# Patient Record
Sex: FEMALE | Race: WHITE | Employment: OTHER | URBAN - NONMETROPOLITAN AREA
[De-identification: names, ages, dates, MRNs, and addresses within clinical notes are randomized per-mention and may not be internally consistent; named-entity substitution may affect disease eponyms.]

---

## 2017-06-06 ENCOUNTER — DOCTOR'S OFFICE (OUTPATIENT)
Dept: URBAN - NONMETROPOLITAN AREA CLINIC 1 | Facility: CLINIC | Age: 82
Setting detail: OPHTHALMOLOGY
End: 2017-06-06
Payer: COMMERCIAL

## 2017-06-06 DIAGNOSIS — E11.9: ICD-10-CM

## 2017-06-06 DIAGNOSIS — H16.223: ICD-10-CM

## 2017-06-06 DIAGNOSIS — H43.811: ICD-10-CM

## 2017-06-06 DIAGNOSIS — H27.8: ICD-10-CM

## 2017-06-06 DIAGNOSIS — H18.51: ICD-10-CM

## 2017-06-06 PROCEDURE — 92014 COMPRE OPH EXAM EST PT 1/>: CPT | Performed by: OPHTHALMOLOGY

## 2017-06-06 ASSESSMENT — LID POSITION - DERMATOCHALASIS
OD_DERMATOCHALASIS: 2+
OS_DERMATOCHALASIS: 2+

## 2017-06-06 ASSESSMENT — PUNCTA - ASSESSMENT
OS_PUNCTA: SIL PLUG LLL
OD_PUNCTA: SIL PLUG RLL

## 2017-06-06 ASSESSMENT — CORNEAL DYSTROPHY - POSTERIOR
OS_POSTERIORDYSTROPHY: 2+ 3+ GUTTATA
OD_POSTERIORDYSTROPHY: 2+ 3+ GUTTATA

## 2017-06-06 ASSESSMENT — SUPERFICIAL PUNCTATE KERATITIS (SPK): OS_SPK: 2+

## 2017-06-06 ASSESSMENT — CONFRONTATIONAL VISUAL FIELD TEST (CVF)
OS_FINDINGS: FULL
OD_FINDINGS: FULL

## 2017-06-06 ASSESSMENT — DRY EYES - PHYSICIAN NOTES
OD_GENERALCOMMENTS: LOW TEAR FILM
OS_GENERALCOMMENTS: LOW TEAR FILM

## 2017-06-06 ASSESSMENT — LID POSITION - PTOSIS
OS_PTOSIS: 1+
OD_PTOSIS: 1+

## 2017-06-08 ASSESSMENT — REFRACTION_MANIFEST
OD_CYLINDER: -0.50
OD_VA3: 20/
OD_VA3: 20/
OU_VA: 20/
OD_VA2: 20/
OD_VA1: 20/
OS_VA3: 20/
OD_VA3: 20/
OD_VA1: 20/
OS_ADD: +2.75
OD_VA2: 20/
OS_VA2: 20/
OD_AXIS: 150
OS_AXIS: 065
OS_VA1: 20/
OD_VA2: 20/30
OS_SPHERE: +1.25
OD_SPHERE: +0.50
OU_VA: 20/
OS_VA2: 20/
OS_VA1: 20/
OS_VA3: 20/
OD_ADD: +2.75
OS_VA2: 20/30
OS_VA3: 20/
OU_VA: 20/
OS_CYLINDER: -0.25
OD_VA1: 20/40+2
OS_VA1: 20/40+2

## 2017-06-08 ASSESSMENT — REFRACTION_CURRENTRX
OD_OVR_VA: 20/
OS_OVR_VA: 20/
OD_OVR_VA: 20/
OS_OVR_VA: 20/
OS_OVR_VA: 20/
OD_OVR_VA: 20/

## 2017-06-08 ASSESSMENT — SPHEQUIV_DERIVED
OD_SPHEQUIV: 0.25
OS_SPHEQUIV: 1.125
OD_SPHEQUIV: 0.25
OS_SPHEQUIV: 0.75

## 2017-06-08 ASSESSMENT — VISUAL ACUITY
OS_BCVA: 20/40-2
OD_BCVA: 20/40-

## 2017-06-08 ASSESSMENT — REFRACTION_AUTOREFRACTION
OS_CYLINDER: -1.00
OD_CYLINDER: -1.50
OD_SPHERE: +1.00
OS_SPHERE: +1.25
OS_AXIS: 92
OD_AXIS: 77

## 2017-09-22 ENCOUNTER — AMBUL SURGICAL CARE (OUTPATIENT)
Dept: URBAN - NONMETROPOLITAN AREA SURGERY 1 | Facility: SURGERY | Age: 82
Setting detail: OPHTHALMOLOGY
End: 2017-09-22
Payer: COMMERCIAL

## 2017-09-22 DIAGNOSIS — D23.12: ICD-10-CM

## 2017-09-22 DIAGNOSIS — D23.11: ICD-10-CM

## 2017-09-22 PROCEDURE — 67810 INCAL BX EYELID SKN LID MRGN: CPT | Performed by: OPHTHALMOLOGY

## 2017-09-22 PROCEDURE — G8907 PT DOC NO EVENTS ON DISCHARG: HCPCS | Performed by: OPHTHALMOLOGY

## 2017-09-22 PROCEDURE — 67961 REVISION OF EYELID: CPT | Performed by: OPHTHALMOLOGY

## 2017-10-16 ENCOUNTER — DOCTOR'S OFFICE (OUTPATIENT)
Dept: URBAN - NONMETROPOLITAN AREA CLINIC 1 | Facility: CLINIC | Age: 82
Setting detail: OPHTHALMOLOGY
End: 2017-10-16
Payer: COMMERCIAL

## 2017-10-16 DIAGNOSIS — H16.223: ICD-10-CM

## 2017-10-16 DIAGNOSIS — H27.8: ICD-10-CM

## 2017-10-16 DIAGNOSIS — E11.9: ICD-10-CM

## 2017-10-16 DIAGNOSIS — D23.12: ICD-10-CM

## 2017-10-16 DIAGNOSIS — D23.11: ICD-10-CM

## 2017-10-16 DIAGNOSIS — H18.51: ICD-10-CM

## 2017-10-16 PROCEDURE — 99024 POSTOP FOLLOW-UP VISIT: CPT | Performed by: OPHTHALMOLOGY

## 2017-10-16 ASSESSMENT — REFRACTION_MANIFEST
OD_SPHERE: +0.50
OD_VA3: 20/
OS_ADD: +2.75
OD_VA1: 20/
OS_VA1: 20/40+2
OD_AXIS: 150
OS_VA1: 20/
OD_VA3: 20/
OS_CYLINDER: -0.25
OS_VA2: 20/
OS_VA2: 20/
OU_VA: 20/
OD_VA2: 20/
OS_VA2: 20/30
OD_VA1: 20/
OS_AXIS: 065
OD_VA2: 20/30
OD_CYLINDER: -0.50
OU_VA: 20/
OS_SPHERE: +1.25
OD_VA1: 20/40+2
OD_ADD: +2.75
OD_VA3: 20/
OU_VA: 20/
OS_VA3: 20/
OD_VA2: 20/
OS_VA3: 20/
OS_VA1: 20/
OS_VA3: 20/

## 2017-10-16 ASSESSMENT — CORNEAL DYSTROPHY - POSTERIOR
OS_POSTERIORDYSTROPHY: 2+ 3+ GUTTATA
OD_POSTERIORDYSTROPHY: 2+ 3+ GUTTATA

## 2017-10-16 ASSESSMENT — REFRACTION_CURRENTRX
OD_OVR_VA: 20/
OS_OVR_VA: 20/
OS_OVR_VA: 20/
OD_OVR_VA: 20/
OS_OVR_VA: 20/
OD_OVR_VA: 20/

## 2017-10-16 ASSESSMENT — REFRACTION_AUTOREFRACTION
OS_CYLINDER: -1.00
OD_CYLINDER: -1.50
OS_SPHERE: +1.25
OS_AXIS: 92
OD_SPHERE: +1.00
OD_AXIS: 77

## 2017-10-16 ASSESSMENT — LID POSITION - DERMATOCHALASIS
OS_DERMATOCHALASIS: 2+
OD_DERMATOCHALASIS: 2+

## 2017-10-16 ASSESSMENT — VISUAL ACUITY
OD_BCVA: 20/40
OS_BCVA: 20/40+1

## 2017-10-16 ASSESSMENT — SPHEQUIV_DERIVED
OS_SPHEQUIV: 0.75
OS_SPHEQUIV: 1.125
OD_SPHEQUIV: 0.25
OD_SPHEQUIV: 0.25

## 2017-10-16 ASSESSMENT — LID POSITION - PTOSIS
OS_PTOSIS: 1+
OD_PTOSIS: 1+

## 2017-10-16 ASSESSMENT — CONFRONTATIONAL VISUAL FIELD TEST (CVF)
OD_FINDINGS: FULL
OS_FINDINGS: FULL

## 2017-10-16 ASSESSMENT — SUPERFICIAL PUNCTATE KERATITIS (SPK)
OS_SPK: 1+ 2+
OD_SPK: 1+ 2+

## 2017-10-16 ASSESSMENT — PUNCTA - ASSESSMENT
OD_PUNCTA: SIL PLUG RLL
OS_PUNCTA: SIL PLUG LLL

## 2018-07-16 ENCOUNTER — RX ONLY (RX ONLY)
Age: 83
End: 2018-07-16

## 2018-07-16 ENCOUNTER — DOCTOR'S OFFICE (OUTPATIENT)
Dept: URBAN - NONMETROPOLITAN AREA CLINIC 1 | Facility: CLINIC | Age: 83
Setting detail: OPHTHALMOLOGY
End: 2018-07-16
Payer: COMMERCIAL

## 2018-07-16 DIAGNOSIS — H02.834: ICD-10-CM

## 2018-07-16 DIAGNOSIS — D23.11: ICD-10-CM

## 2018-07-16 DIAGNOSIS — H16.223: ICD-10-CM

## 2018-07-16 DIAGNOSIS — H18.51: ICD-10-CM

## 2018-07-16 DIAGNOSIS — H02.831: ICD-10-CM

## 2018-07-16 DIAGNOSIS — H27.8: ICD-10-CM

## 2018-07-16 PROCEDURE — 92012 INTRM OPH EXAM EST PATIENT: CPT | Performed by: OPHTHALMOLOGY

## 2018-07-16 PROCEDURE — 76514 ECHO EXAM OF EYE THICKNESS: CPT | Performed by: OPHTHALMOLOGY

## 2018-07-16 PROCEDURE — 83861 MICROFLUID ANALY TEARS: CPT | Performed by: OPHTHALMOLOGY

## 2018-07-16 PROCEDURE — 92285 EXTERNAL OCULAR PHOTOGRAPHY: CPT | Performed by: OPHTHALMOLOGY

## 2018-07-16 ASSESSMENT — CONFRONTATIONAL VISUAL FIELD TEST (CVF)
OS_FINDINGS: FULL
OD_FINDINGS: FULL

## 2018-07-16 ASSESSMENT — REFRACTION_CURRENTRX
OS_OVR_VA: 20/
OS_OVR_VA: 20/
OD_OVR_VA: 20/
OD_OVR_VA: 20/
OS_OVR_VA: 20/
OD_OVR_VA: 20/

## 2018-07-16 ASSESSMENT — REFRACTION_MANIFEST
OS_VA1: 20/
OS_CYLINDER: -0.25
OS_VA3: 20/
OS_ADD: +2.75
OD_VA1: 20/
OD_SPHERE: +0.50
OD_VA1: 20/
OS_VA1: 20/40+2
OD_VA3: 20/
OS_VA2: 20/
OD_VA2: 20/
OS_VA3: 20/
OS_VA1: 20/
OS_VA3: 20/
OD_VA3: 20/
OD_CYLINDER: -0.50
OS_AXIS: 065
OD_VA2: 20/30
OD_ADD: +2.75
OD_VA1: 20/40+2
OU_VA: 20/
OS_VA2: 20/30
OU_VA: 20/
OS_VA2: 20/
OS_SPHERE: +1.25
OD_AXIS: 150
OD_VA2: 20/
OD_VA3: 20/
OU_VA: 20/

## 2018-07-16 ASSESSMENT — REFRACTION_AUTOREFRACTION
OD_AXIS: 77
OS_SPHERE: +1.25
OD_SPHERE: +1.00
OD_CYLINDER: -1.50
OS_AXIS: 92
OS_CYLINDER: -1.00

## 2018-07-16 ASSESSMENT — LID POSITION - DERMATOCHALASIS
OS_DERMATOCHALASIS: 2+
OD_DERMATOCHALASIS: 2+

## 2018-07-16 ASSESSMENT — LID POSITION - PTOSIS
OD_PTOSIS: 1+
OS_PTOSIS: 1+

## 2018-07-16 ASSESSMENT — PUNCTA - ASSESSMENT
OD_PUNCTA: SIL PLUG RLL
OS_PUNCTA: SIL PLUG LLL

## 2018-07-16 ASSESSMENT — CORNEAL DYSTROPHY - POSTERIOR
OD_POSTERIORDYSTROPHY: 2+ 3+ GUTTATA
OS_POSTERIORDYSTROPHY: 2+ 3+ GUTTATA

## 2018-07-16 ASSESSMENT — SPHEQUIV_DERIVED
OS_SPHEQUIV: 0.75
OD_SPHEQUIV: 0.25
OS_SPHEQUIV: 1.125
OD_SPHEQUIV: 0.25

## 2018-07-16 ASSESSMENT — VISUAL ACUITY
OS_BCVA: 20/40+1
OD_BCVA: 20/50-2

## 2018-07-16 ASSESSMENT — PACHYMETRY
OD_CT_UM: 570
OS_CT_CORRECTION: -4
OD_CT_CORRECTION: -2
OS_CT_UM: 594

## 2018-07-16 ASSESSMENT — SUPERFICIAL PUNCTATE KERATITIS (SPK)
OD_SPK: 1+ 2+
OS_SPK: 1+

## 2018-07-24 ENCOUNTER — DOCTOR'S OFFICE (OUTPATIENT)
Dept: URBAN - NONMETROPOLITAN AREA CLINIC 1 | Facility: CLINIC | Age: 83
Setting detail: OPHTHALMOLOGY
End: 2018-07-24
Payer: COMMERCIAL

## 2018-07-24 DIAGNOSIS — H52.4: ICD-10-CM

## 2018-07-24 DIAGNOSIS — H27.8: ICD-10-CM

## 2018-07-24 PROCEDURE — 92015 DETERMINE REFRACTIVE STATE: CPT | Performed by: OPTOMETRIST

## 2018-07-24 ASSESSMENT — REFRACTION_CURRENTRX
OS_OVR_VA: 20/
OS_OVR_VA: 20/
OD_OVR_VA: 20/
OS_OVR_VA: 20/
OD_OVR_VA: 20/
OD_OVR_VA: 20/

## 2018-07-24 ASSESSMENT — REFRACTION_MANIFEST
OD_VA1: 20/
OS_VA3: 20/
OS_VA3: 20/
OS_VA1: 20/
OS_VA2: 20/
OS_VA1: 20/
OD_VA2: 20/
OD_VA3: 20/
OU_VA: 20/
OD_VA1: 20/
OS_VA2: 20/
OU_VA: 20/
OD_VA3: 20/
OD_VA2: 20/

## 2018-07-24 ASSESSMENT — REFRACTION_AUTOREFRACTION
OD_CYLINDER: -0.50
OS_CYLINDER: -1.25
OS_SPHERE: +0.50
OD_SPHERE: +0.75
OS_AXIS: 057
OD_AXIS: 150

## 2018-07-24 ASSESSMENT — SPHEQUIV_DERIVED
OD_SPHEQUIV: 0.5
OS_SPHEQUIV: -0.125

## 2018-07-24 ASSESSMENT — REFRACTION_OUTSIDERX
OU_VA: 20/
OD_CYLINDER: -0.50
OS_SPHERE: +1.00
OS_AXIS: 065
OD_SPHERE: +0.50
OD_VA1: 20/40+2
OS_VA3: 20/
OS_VA2: 20/40
OD_VA2: 20/30
OS_VA1: 20/40
OS_ADD: +2.75
OD_ADD: +2.75
OD_AXIS: 150
OD_VA3: 20/
OS_CYLINDER: -0.25

## 2018-07-24 ASSESSMENT — VISUAL ACUITY
OS_BCVA: 20/40-1
OD_BCVA: 20/50-1

## 2018-10-23 ENCOUNTER — AMBUL SURGICAL CARE (OUTPATIENT)
Dept: URBAN - NONMETROPOLITAN AREA SURGERY 1 | Facility: SURGERY | Age: 83
Setting detail: OPHTHALMOLOGY
End: 2018-10-23
Payer: COMMERCIAL

## 2018-10-23 DIAGNOSIS — D23.121: ICD-10-CM

## 2018-10-23 DIAGNOSIS — D23.11: ICD-10-CM

## 2018-10-23 PROCEDURE — G8907 PT DOC NO EVENTS ON DISCHARG: HCPCS | Performed by: OPHTHALMOLOGY

## 2018-10-23 PROCEDURE — 67810 INCAL BX EYELID SKN LID MRGN: CPT | Performed by: OPHTHALMOLOGY

## 2018-10-23 PROCEDURE — 67840 REMOVE EYELID LESION: CPT | Performed by: OPHTHALMOLOGY

## 2018-10-23 PROCEDURE — G8918 PT W/O PREOP ORDER IV AB PRO: HCPCS | Performed by: OPHTHALMOLOGY

## 2021-01-21 ENCOUNTER — IMMUNIZATIONS (OUTPATIENT)
Dept: FAMILY MEDICINE CLINIC | Facility: HOSPITAL | Age: 86
End: 2021-01-21

## 2021-01-21 DIAGNOSIS — Z23 ENCOUNTER FOR IMMUNIZATION: Primary | ICD-10-CM

## 2021-01-21 PROCEDURE — 0011A SARS-COV-2 / COVID-19 MRNA VACCINE (MODERNA) 100 MCG: CPT

## 2021-01-21 PROCEDURE — 91301 SARS-COV-2 / COVID-19 MRNA VACCINE (MODERNA) 100 MCG: CPT

## 2021-02-24 ENCOUNTER — IMMUNIZATIONS (OUTPATIENT)
Dept: FAMILY MEDICINE CLINIC | Facility: HOSPITAL | Age: 86
End: 2021-02-24

## 2021-02-24 DIAGNOSIS — Z23 ENCOUNTER FOR IMMUNIZATION: Primary | ICD-10-CM

## 2021-02-24 PROCEDURE — 0012A SARS-COV-2 / COVID-19 MRNA VACCINE (MODERNA) 100 MCG: CPT

## 2021-02-24 PROCEDURE — 91301 SARS-COV-2 / COVID-19 MRNA VACCINE (MODERNA) 100 MCG: CPT

## 2021-04-09 ENCOUNTER — DOCTOR'S OFFICE (OUTPATIENT)
Dept: URBAN - NONMETROPOLITAN AREA CLINIC 1 | Facility: CLINIC | Age: 86
Setting detail: OPHTHALMOLOGY
End: 2021-04-09
Payer: COMMERCIAL

## 2021-04-09 ENCOUNTER — RX ONLY (RX ONLY)
Age: 86
End: 2021-04-09

## 2021-04-09 VITALS — HEIGHT: 55 IN

## 2021-04-09 DIAGNOSIS — H16.223: ICD-10-CM

## 2021-04-09 DIAGNOSIS — H27.8: ICD-10-CM

## 2021-04-09 DIAGNOSIS — H02.433: ICD-10-CM

## 2021-04-09 DIAGNOSIS — H02.423: ICD-10-CM

## 2021-04-09 DIAGNOSIS — H02.413: ICD-10-CM

## 2021-04-09 DIAGNOSIS — H43.811: ICD-10-CM

## 2021-04-09 DIAGNOSIS — H43.393: ICD-10-CM

## 2021-04-09 DIAGNOSIS — E11.9: ICD-10-CM

## 2021-04-09 DIAGNOSIS — H02.403: ICD-10-CM

## 2021-04-09 DIAGNOSIS — H18.70: ICD-10-CM

## 2021-04-09 PROCEDURE — 92134 CPTRZ OPH DX IMG PST SGM RTA: CPT | Performed by: OPHTHALMOLOGY

## 2021-04-09 PROCEDURE — 92014 COMPRE OPH EXAM EST PT 1/>: CPT | Performed by: OPHTHALMOLOGY

## 2021-04-09 PROCEDURE — 92286 ANT SGM IMG I&R SPECLR MIC: CPT | Performed by: OPHTHALMOLOGY

## 2021-04-09 ASSESSMENT — SUPERFICIAL PUNCTATE KERATITIS (SPK)
OS_SPK: 1+
OD_SPK: 1+ 2+

## 2021-04-09 ASSESSMENT — TONOMETRY
OD_IOP_MMHG: 13
OS_IOP_MMHG: 13

## 2021-04-09 ASSESSMENT — REFRACTION_MANIFEST
OS_CYLINDER: -0.25
OD_AXIS: 150
OD_CYLINDER: -0.50
OS_VA2: 20/40
OS_VA1: 20/40
OD_VA2: 20/30
OD_ADD: +2.75
OS_AXIS: 065
OD_SPHERE: +0.50
OS_ADD: +2.75
OS_SPHERE: +1.00
OD_VA1: 20/40+2

## 2021-04-09 ASSESSMENT — CONFRONTATIONAL VISUAL FIELD TEST (CVF)
OS_FINDINGS: FULL
OD_FINDINGS: FULL

## 2021-04-09 ASSESSMENT — PUNCTA - ASSESSMENT
OS_PUNCTA: SIL PLUG LLL
OD_PUNCTA: SIL PLUG RLL

## 2021-04-09 ASSESSMENT — LID POSITION - DERMATOCHALASIS
OD_DERMATOCHALASIS: 2+
OS_DERMATOCHALASIS: 2+

## 2021-04-09 ASSESSMENT — SPHEQUIV_DERIVED
OS_SPHEQUIV: 0.875
OD_SPHEQUIV: 0.25

## 2021-04-09 ASSESSMENT — PACHYMETRY
OS_CT_UM: 594
OD_CT_UM: 570
OD_CT_CORRECTION: -2
OS_CT_CORRECTION: -4

## 2021-04-09 ASSESSMENT — VISUAL ACUITY
OD_BCVA: 20/100
OS_BCVA: 20/70+1

## 2021-04-09 ASSESSMENT — LID POSITION - PTOSIS
OS_PTOSIS: 1+
OD_PTOSIS: 1+

## 2021-04-09 ASSESSMENT — CORNEAL DYSTROPHY - POSTERIOR
OD_POSTERIORDYSTROPHY: 2+ 3+ GUTTATA
OS_POSTERIORDYSTROPHY: 2+ 3+ GUTTATA

## 2021-04-09 ASSESSMENT — REFRACTION_CURRENTRX
OS_OVR_VA: 20/
OD_OVR_VA: 20/

## 2021-04-27 ENCOUNTER — DOCTOR'S OFFICE (OUTPATIENT)
Dept: URBAN - NONMETROPOLITAN AREA CLINIC 1 | Facility: CLINIC | Age: 86
Setting detail: OPHTHALMOLOGY
End: 2021-04-27
Payer: COMMERCIAL

## 2021-04-27 ENCOUNTER — OPTICAL OFFICE (OUTPATIENT)
Dept: URBAN - NONMETROPOLITAN AREA CLINIC 4 | Facility: CLINIC | Age: 86
Setting detail: OPHTHALMOLOGY
End: 2021-04-27
Payer: COMMERCIAL

## 2021-04-27 DIAGNOSIS — H27.8: ICD-10-CM

## 2021-04-27 DIAGNOSIS — H52.4: ICD-10-CM

## 2021-04-27 DIAGNOSIS — H52.223: ICD-10-CM

## 2021-04-27 PROCEDURE — V2020 VISION SVCS FRAMES PURCHASES: HCPCS | Performed by: OPTOMETRIST

## 2021-04-27 PROCEDURE — 92015 DETERMINE REFRACTIVE STATE: CPT | Performed by: OPTOMETRIST

## 2021-04-27 PROCEDURE — V2203 LENS SPHCYL BIFOCAL 4.00D/.1: HCPCS | Performed by: OPTOMETRIST

## 2021-04-27 PROCEDURE — V2784 LENS POLYCARB OR EQUAL: HCPCS | Performed by: OPTOMETRIST

## 2021-04-27 ASSESSMENT — REFRACTION_MANIFEST
OD_CYLINDER: -1.00
OS_AXIS: 045
OD_SPHERE: +1.00
OS_VA2: 20/70+2
OS_SPHERE: +0.25
OS_ADD: +2.75
OS_CYLINDER: -1.50
OD_VA1: 20/60+2
OD_ADD: +2.75
OD_VA2: 20/60+2
OS_VA1: 20/70+2
OD_AXIS: 050

## 2021-04-27 ASSESSMENT — REFRACTION_CURRENTRX
OS_AXIS: 174
OS_OVR_VA: 20/
OD_AXIS: 095
OD_OVR_VA: 20/
OD_SPHERE: -0.75
OS_CYLINDER: -0.50
OD_CYLINDER: -2.00
OS_SPHERE: -1.00

## 2021-04-27 ASSESSMENT — VISUAL ACUITY
OD_BCVA: 20/80
OS_BCVA: 20/70

## 2021-04-27 ASSESSMENT — REFRACTION_AUTOREFRACTION
OD_SPHERE: +1.50
OD_CYLINDER: -1.25
OD_AXIS: 049
OS_AXIS: 046
OS_CYLINDER: -2.50
OS_SPHERE: +0.75

## 2021-04-27 ASSESSMENT — SPHEQUIV_DERIVED
OD_SPHEQUIV: 0.5
OS_SPHEQUIV: -0.5
OD_SPHEQUIV: 0.875
OS_SPHEQUIV: -0.5

## 2021-04-27 ASSESSMENT — CONFRONTATIONAL VISUAL FIELD TEST (CVF)
OS_FINDINGS: FULL
OD_FINDINGS: FULL

## 2021-06-01 ENCOUNTER — DOCTOR'S OFFICE (OUTPATIENT)
Dept: URBAN - NONMETROPOLITAN AREA CLINIC 1 | Facility: CLINIC | Age: 86
Setting detail: OPHTHALMOLOGY
End: 2021-06-01

## 2021-06-01 DIAGNOSIS — H52.4: ICD-10-CM

## 2021-06-01 DIAGNOSIS — H27.8: ICD-10-CM

## 2021-06-01 PROCEDURE — NCRX N/C GLASSES RX: Performed by: OPTOMETRIST

## 2021-06-01 ASSESSMENT — REFRACTION_MANIFEST
OS_VA1: 20/70+2
OD_VA1: 20/60+2
OS_SPHERE: +0.25
OS_CYLINDER: -2.00
OD_ADD: +2.75
OS_AXIS: 045
OD_VA2: 20/60+2
OS_ADD: +2.75
OS_VA2: 20/70+2
OD_CYLINDER: -1.00
OD_SPHERE: +0.75
OD_AXIS: 100

## 2021-06-01 ASSESSMENT — REFRACTION_CURRENTRX
OS_OVR_VA: 20/
OS_CYLINDER: -1.50
OS_SPHERE: +0.25
OD_AXIS: 058
OS_AXIS: 048
OD_SPHERE: +1.25
OD_CYLINDER: -1.00
OD_OVR_VA: 20/
OD_ADD: +3.00
OD_VPRISM_DIRECTION: BF
OS_VPRISM_DIRECTION: BF
OS_ADD: +3.00

## 2021-06-01 ASSESSMENT — CONFRONTATIONAL VISUAL FIELD TEST (CVF)
OD_FINDINGS: FULL
OS_FINDINGS: FULL

## 2021-06-01 ASSESSMENT — REFRACTION_AUTOREFRACTION
OD_CYLINDER: -0.25
OD_AXIS: 116
OD_SPHERE: +0.75
OS_CYLINDER: -2.00
OS_SPHERE: 0.00
OS_AXIS: 072

## 2021-06-01 ASSESSMENT — SPHEQUIV_DERIVED
OD_SPHEQUIV: 0.625
OD_SPHEQUIV: 0.25
OS_SPHEQUIV: -1
OS_SPHEQUIV: -0.75

## 2021-06-01 ASSESSMENT — VISUAL ACUITY
OS_BCVA: 20/60-2
OD_BCVA: 20/70

## 2021-11-30 ENCOUNTER — DOCTOR'S OFFICE (OUTPATIENT)
Dept: URBAN - NONMETROPOLITAN AREA CLINIC 1 | Facility: CLINIC | Age: 86
Setting detail: OPHTHALMOLOGY
End: 2021-11-30
Payer: COMMERCIAL

## 2021-11-30 DIAGNOSIS — H16.221: ICD-10-CM

## 2021-11-30 DIAGNOSIS — H18.70: ICD-10-CM

## 2021-11-30 DIAGNOSIS — H52.221: ICD-10-CM

## 2021-11-30 DIAGNOSIS — H52.222: ICD-10-CM

## 2021-11-30 DIAGNOSIS — H27.8: ICD-10-CM

## 2021-11-30 DIAGNOSIS — H16.223: ICD-10-CM

## 2021-11-30 DIAGNOSIS — H43.811: ICD-10-CM

## 2021-11-30 DIAGNOSIS — H16.222: ICD-10-CM

## 2021-11-30 PROCEDURE — 83861 MICROFLUID ANALY TEARS: CPT | Performed by: OPHTHALMOLOGY

## 2021-11-30 PROCEDURE — 92025 CPTRIZED CORNEAL TOPOGRAPHY: CPT | Performed by: OPHTHALMOLOGY

## 2021-11-30 PROCEDURE — 92286 ANT SGM IMG I&R SPECLR MIC: CPT | Performed by: OPHTHALMOLOGY

## 2021-11-30 PROCEDURE — 92134 CPTRZ OPH DX IMG PST SGM RTA: CPT | Performed by: OPHTHALMOLOGY

## 2021-11-30 PROCEDURE — 99214 OFFICE O/P EST MOD 30 MIN: CPT | Performed by: OPHTHALMOLOGY

## 2021-11-30 ASSESSMENT — REFRACTION_MANIFEST
OD_AXIS: 100
OS_CYLINDER: -2.00
OS_SPHERE: +0.25
OD_CYLINDER: -1.00
OS_VA2: 20/70+2
OS_ADD: +2.75
OD_VA2: 20/60+2
OD_VA1: 20/60+2
OS_AXIS: 045
OS_VA1: 20/70+2
OD_SPHERE: +0.75
OD_ADD: +2.75

## 2021-11-30 ASSESSMENT — REFRACTION_AUTOREFRACTION
OD_CYLINDER: -0.25
OS_SPHERE: 0.00
OD_SPHERE: +0.75
OS_AXIS: 072
OS_CYLINDER: -2.00
OD_AXIS: 116

## 2021-11-30 ASSESSMENT — TONOMETRY: OS_IOP_MMHG: 11

## 2021-11-30 ASSESSMENT — PACHYMETRY
OD_CT_UM: 570
OS_CT_CORRECTION: -4
OD_CT_CORRECTION: -2
OS_CT_UM: 594

## 2021-11-30 ASSESSMENT — SUPERFICIAL PUNCTATE KERATITIS (SPK)
OD_SPK: 1+ 2+
OS_SPK: 1+

## 2021-11-30 ASSESSMENT — LID POSITION - DERMATOCHALASIS
OD_DERMATOCHALASIS: 2+
OS_DERMATOCHALASIS: 2+

## 2021-11-30 ASSESSMENT — CORNEAL DYSTROPHY - POSTERIOR
OS_POSTERIORDYSTROPHY: 2+ 3+ GUTTATA
OD_POSTERIORDYSTROPHY: 2+ 3+ GUTTATA

## 2021-11-30 ASSESSMENT — REFRACTION_CURRENTRX
OD_VPRISM_DIRECTION: BF
OS_VPRISM_DIRECTION: BF
OD_SPHERE: +1.25
OS_OVR_VA: 20/
OD_OVR_VA: 20/
OS_ADD: +3.00
OS_SPHERE: +0.25
OD_ADD: +3.00
OS_AXIS: 048
OS_CYLINDER: -1.50
OD_CYLINDER: -1.00
OD_AXIS: 058

## 2021-11-30 ASSESSMENT — VISUAL ACUITY
OD_BCVA: 20/50-2
OS_BCVA: 20/60

## 2021-11-30 ASSESSMENT — LID POSITION - PTOSIS
OS_PTOSIS: 1+
OD_PTOSIS: 1+

## 2021-11-30 ASSESSMENT — CONFRONTATIONAL VISUAL FIELD TEST (CVF)
OD_FINDINGS: FULL
OS_FINDINGS: FULL

## 2021-11-30 ASSESSMENT — SPHEQUIV_DERIVED
OD_SPHEQUIV: 0.625
OS_SPHEQUIV: -0.75
OS_SPHEQUIV: -1
OD_SPHEQUIV: 0.25

## 2021-11-30 ASSESSMENT — PUNCTA - ASSESSMENT
OS_PUNCTA: SIL PLUG LLL
OD_PUNCTA: SIL PLUG RLL

## 2021-12-06 ENCOUNTER — DOCTOR'S OFFICE (OUTPATIENT)
Dept: URBAN - NONMETROPOLITAN AREA CLINIC 1 | Facility: CLINIC | Age: 86
Setting detail: OPHTHALMOLOGY
End: 2021-12-06

## 2021-12-06 ENCOUNTER — OPTICAL OFFICE (OUTPATIENT)
Dept: URBAN - NONMETROPOLITAN AREA CLINIC 4 | Facility: CLINIC | Age: 86
Setting detail: OPHTHALMOLOGY
End: 2021-12-06

## 2021-12-06 DIAGNOSIS — H52.4: ICD-10-CM

## 2021-12-06 DIAGNOSIS — H52.223: ICD-10-CM

## 2021-12-06 DIAGNOSIS — H52.222: ICD-10-CM

## 2021-12-06 DIAGNOSIS — H52.221: ICD-10-CM

## 2021-12-06 PROCEDURE — V2103 SPHEROCYLINDR 4.00D/12-2.00D: HCPCS | Performed by: OPTOMETRIST

## 2021-12-06 PROCEDURE — V2784 LENS POLYCARB OR EQUAL: HCPCS | Performed by: OPTOMETRIST

## 2021-12-06 PROCEDURE — V2715 PRISM LENS/ES: HCPCS | Performed by: OPTOMETRIST

## 2021-12-06 PROCEDURE — NCRX N/C GLASSES RX: Performed by: OPTOMETRIST

## 2021-12-06 PROCEDURE — V2020 VISION SVCS FRAMES PURCHASES: HCPCS | Performed by: OPTOMETRIST

## 2021-12-06 ASSESSMENT — REFRACTION_CURRENTRX
OD_AXIS: 61
OD_OVR_VA: 20/
OD_VPRISM_DIRECTION: SV
OD_SPHERE: +3.25
OS_SPHERE: +3.75
OS_AXIS: 43
OS_SPHERE: +0.25
OS_OVR_VA: 20/
OD_AXIS: 99
OD_OVR_VA: 20/
OS_AXIS: 75
OS_VPRISM_DIRECTION: BF
OD_CYLINDER: -0.50
OD_ADD: +3.00
OS_ADD: +3.00
OD_SPHERE: +0.75
OD_CYLINDER: -1.00
OS_CYLINDER: -2.00
OD_VPRISM_DIRECTION: BF
OS_OVR_VA: 20/
OS_VPRISM_DIRECTION: SV
OS_CYLINDER: -1.00

## 2021-12-06 ASSESSMENT — REFRACTION_MANIFEST
OD_ADD: +2.75
OS_AXIS: 070
OD_CYLINDER: -0.75
OD_VA1: 20/40
OS_CYLINDER: -1.50
OD_VPRISM_DIRECTION: BI
OS_VA2: 20/50+2
OS_ADD: +2.75
OD_AXIS: 070
OS_VA1: 20/50+2
OD_VA2: 20/40
OD_SPHERE: +0.50
OS_VPRISM: BU
OS_SPHERE: PLANO
OD_HPRISM: 1.5

## 2021-12-06 ASSESSMENT — CONFRONTATIONAL VISUAL FIELD TEST (CVF)
OS_FINDINGS: FULL
OD_FINDINGS: FULL

## 2021-12-06 ASSESSMENT — REFRACTION_AUTOREFRACTION
OS_AXIS: 75
OD_AXIS: 50
OD_SPHERE: +0.50
OS_SPHERE: -0.25
OD_CYLINDER: -0.50
OS_CYLINDER: -1.75

## 2021-12-06 ASSESSMENT — SPHEQUIV_DERIVED
OD_SPHEQUIV: 0.25
OD_SPHEQUIV: 0.125
OS_SPHEQUIV: -1.125

## 2021-12-06 ASSESSMENT — VISUAL ACUITY
OD_BCVA: 20/60+2
OS_BCVA: 20/50-1

## 2021-12-10 ENCOUNTER — DOCTOR'S OFFICE (OUTPATIENT)
Dept: URBAN - NONMETROPOLITAN AREA CLINIC 1 | Facility: CLINIC | Age: 86
Setting detail: OPHTHALMOLOGY
End: 2021-12-10
Payer: COMMERCIAL

## 2021-12-10 DIAGNOSIS — H02.413: ICD-10-CM

## 2021-12-10 DIAGNOSIS — H02.831: ICD-10-CM

## 2021-12-10 DIAGNOSIS — H16.221: ICD-10-CM

## 2021-12-10 DIAGNOSIS — H02.834: ICD-10-CM

## 2021-12-10 DIAGNOSIS — H16.222: ICD-10-CM

## 2021-12-10 DIAGNOSIS — H02.403: ICD-10-CM

## 2021-12-10 DIAGNOSIS — H02.423: ICD-10-CM

## 2021-12-10 DIAGNOSIS — H02.433: ICD-10-CM

## 2021-12-10 PROCEDURE — 99214 OFFICE O/P EST MOD 30 MIN: CPT | Performed by: OPHTHALMOLOGY

## 2021-12-10 ASSESSMENT — REFRACTION_CURRENTRX
OS_CYLINDER: -1.00
OS_VPRISM_DIRECTION: SV
OD_SPHERE: +3.25
OD_AXIS: 61
OD_AXIS: 99
OD_CYLINDER: -1.00
OS_VPRISM_DIRECTION: BF
OD_OVR_VA: 20/
OD_SPHERE: +0.75
OS_OVR_VA: 20/
OD_VPRISM_DIRECTION: SV
OS_AXIS: 43
OD_OVR_VA: 20/
OD_CYLINDER: -0.50
OS_SPHERE: +0.25
OS_CYLINDER: -2.00
OD_ADD: +3.00
OD_VPRISM_DIRECTION: BF
OS_OVR_VA: 20/
OS_AXIS: 75
OS_SPHERE: +3.75
OS_ADD: +3.00

## 2021-12-10 ASSESSMENT — REFRACTION_MANIFEST
OS_VPRISM: BU
OD_SPHERE: +0.50
OS_SPHERE: PLANO
OD_ADD: +2.75
OS_ADD: +2.75
OD_AXIS: 070
OD_HPRISM: 1.5
OD_VA1: 20/40
OS_VA2: 20/50+2
OS_VA1: 20/50+2
OD_CYLINDER: -0.75
OD_VPRISM_DIRECTION: BI
OD_VA2: 20/40
OS_CYLINDER: -1.50
OS_AXIS: 070

## 2021-12-10 ASSESSMENT — CONFRONTATIONAL VISUAL FIELD TEST (CVF)
OD_FINDINGS: FULL
OS_FINDINGS: FULL

## 2021-12-10 ASSESSMENT — REFRACTION_AUTOREFRACTION
OS_SPHERE: +1.25
OD_CYLINDER: -2.75
OD_SPHERE: +1.75
OS_AXIS: 69
OD_AXIS: 35
OS_CYLINDER: -3.00

## 2021-12-10 ASSESSMENT — LID POSITION - PTOSIS
OS_PTOSIS: 1+
OD_PTOSIS: 1+

## 2021-12-10 ASSESSMENT — SUPERFICIAL PUNCTATE KERATITIS (SPK)
OS_SPK: 1+
OD_SPK: 1+ 2+

## 2021-12-10 ASSESSMENT — VISUAL ACUITY
OD_BCVA: 20/60+2
OS_BCVA: 20/50

## 2021-12-10 ASSESSMENT — LID POSITION - DERMATOCHALASIS
OD_DERMATOCHALASIS: 2+
OS_DERMATOCHALASIS: 2+

## 2021-12-10 ASSESSMENT — PUNCTA - ASSESSMENT
OD_PUNCTA: SIL PLUG RLL
OS_PUNCTA: SIL PLUG LLL

## 2021-12-10 ASSESSMENT — SPHEQUIV_DERIVED
OS_SPHEQUIV: -0.25
OD_SPHEQUIV: 0.375
OD_SPHEQUIV: 0.125

## 2021-12-10 ASSESSMENT — CORNEAL DYSTROPHY - POSTERIOR
OD_POSTERIORDYSTROPHY: 2+ 3+ GUTTATA
OS_POSTERIORDYSTROPHY: 2+ 3+ GUTTATA

## 2021-12-17 ENCOUNTER — DOCTOR'S OFFICE (OUTPATIENT)
Dept: URBAN - NONMETROPOLITAN AREA CLINIC 1 | Facility: CLINIC | Age: 86
Setting detail: OPHTHALMOLOGY
End: 2021-12-17
Payer: COMMERCIAL

## 2021-12-17 DIAGNOSIS — H02.403: ICD-10-CM

## 2021-12-17 DIAGNOSIS — H02.423: ICD-10-CM

## 2021-12-17 DIAGNOSIS — H02.831: ICD-10-CM

## 2021-12-17 DIAGNOSIS — H02.834: ICD-10-CM

## 2021-12-17 DIAGNOSIS — H02.413: ICD-10-CM

## 2021-12-17 DIAGNOSIS — H02.433: ICD-10-CM

## 2021-12-17 PROCEDURE — 92081 LIMITED VISUAL FIELD XM: CPT | Performed by: OPHTHALMOLOGY

## 2021-12-27 ENCOUNTER — APPOINTMENT (EMERGENCY)
Dept: CT IMAGING | Facility: HOSPITAL | Age: 86
End: 2021-12-27
Payer: COMMERCIAL

## 2021-12-27 ENCOUNTER — APPOINTMENT (EMERGENCY)
Dept: RADIOLOGY | Facility: HOSPITAL | Age: 86
End: 2021-12-27
Payer: COMMERCIAL

## 2021-12-27 ENCOUNTER — HOSPITAL ENCOUNTER (EMERGENCY)
Facility: HOSPITAL | Age: 86
Discharge: HOME | End: 2021-12-27
Attending: EMERGENCY MEDICINE | Admitting: EMERGENCY MEDICINE
Payer: COMMERCIAL

## 2021-12-27 VITALS
HEART RATE: 60 BPM | HEIGHT: 64 IN | SYSTOLIC BLOOD PRESSURE: 163 MMHG | BODY MASS INDEX: 32.44 KG/M2 | WEIGHT: 190 LBS | DIASTOLIC BLOOD PRESSURE: 88 MMHG | TEMPERATURE: 97.2 F | RESPIRATION RATE: 23 BRPM | OXYGEN SATURATION: 91 %

## 2021-12-27 DIAGNOSIS — R07.89 CHEST WALL PAIN: Primary | ICD-10-CM

## 2021-12-27 LAB
ALBUMIN SERPL BCP-MCNC: 3.9 G/DL (ref 3.5–5)
ALP SERPL-CCNC: 104 U/L (ref 46–116)
ALT SERPL W P-5'-P-CCNC: 45 U/L (ref 12–78)
ANION GAP SERPL CALCULATED.3IONS-SCNC: 11 MMOL/L (ref 4–13)
AST SERPL W P-5'-P-CCNC: 37 U/L (ref 5–45)
BASOPHILS # BLD AUTO: 0.08 THOUSANDS/ΜL (ref 0–0.1)
BASOPHILS NFR BLD AUTO: 1 % (ref 0–1)
BILIRUB SERPL-MCNC: 0.45 MG/DL (ref 0.2–1)
BUN SERPL-MCNC: 14 MG/DL (ref 5–25)
CALCIUM SERPL-MCNC: 8.8 MG/DL (ref 8.3–10.1)
CARDIAC TROPONIN I PNL SERPL HS: 4 NG/L
CHLORIDE SERPL-SCNC: 103 MMOL/L (ref 100–108)
CO2 SERPL-SCNC: 26 MMOL/L (ref 21–32)
CREAT SERPL-MCNC: 0.78 MG/DL (ref 0.6–1.3)
D DIMER PPP FEU-MCNC: 0.97 UG/ML FEU
EOSINOPHIL # BLD AUTO: 0.14 THOUSAND/ΜL (ref 0–0.61)
EOSINOPHIL NFR BLD AUTO: 2 % (ref 0–6)
ERYTHROCYTE [DISTWIDTH] IN BLOOD BY AUTOMATED COUNT: 13.1 % (ref 11.6–15.1)
GFR SERPL CREATININE-BSD FRML MDRD: 69 ML/MIN/1.73SQ M
GLUCOSE SERPL-MCNC: 113 MG/DL (ref 65–140)
HCT VFR BLD AUTO: 42.7 % (ref 34.8–46.1)
HGB BLD-MCNC: 14 G/DL (ref 11.5–15.4)
IMM GRANULOCYTES # BLD AUTO: 0.04 THOUSAND/UL (ref 0–0.2)
IMM GRANULOCYTES NFR BLD AUTO: 1 % (ref 0–2)
LYMPHOCYTES # BLD AUTO: 2.46 THOUSANDS/ΜL (ref 0.6–4.47)
LYMPHOCYTES NFR BLD AUTO: 33 % (ref 14–44)
MCH RBC QN AUTO: 30 PG (ref 26.8–34.3)
MCHC RBC AUTO-ENTMCNC: 32.8 G/DL (ref 31.4–37.4)
MCV RBC AUTO: 92 FL (ref 82–98)
MONOCYTES # BLD AUTO: 0.89 THOUSAND/ΜL (ref 0.17–1.22)
MONOCYTES NFR BLD AUTO: 12 % (ref 4–12)
NEUTROPHILS # BLD AUTO: 3.84 THOUSANDS/ΜL (ref 1.85–7.62)
NEUTS SEG NFR BLD AUTO: 51 % (ref 43–75)
NRBC BLD AUTO-RTO: 0 /100 WBCS
PLATELET # BLD AUTO: 161 THOUSANDS/UL (ref 149–390)
PMV BLD AUTO: 10.8 FL (ref 8.9–12.7)
POTASSIUM SERPL-SCNC: 3.8 MMOL/L (ref 3.5–5.3)
PROT SERPL-MCNC: 7.4 G/DL (ref 6.4–8.2)
RBC # BLD AUTO: 4.66 MILLION/UL (ref 3.81–5.12)
SODIUM SERPL-SCNC: 140 MMOL/L (ref 136–145)
WBC # BLD AUTO: 7.45 THOUSAND/UL (ref 4.31–10.16)

## 2021-12-27 PROCEDURE — 80053 COMPREHEN METABOLIC PANEL: CPT | Performed by: EMERGENCY MEDICINE

## 2021-12-27 PROCEDURE — 99285 EMERGENCY DEPT VISIT HI MDM: CPT

## 2021-12-27 PROCEDURE — 71275 CT ANGIOGRAPHY CHEST: CPT

## 2021-12-27 PROCEDURE — 99285 EMERGENCY DEPT VISIT HI MDM: CPT | Performed by: EMERGENCY MEDICINE

## 2021-12-27 PROCEDURE — 85025 COMPLETE CBC W/AUTO DIFF WBC: CPT | Performed by: EMERGENCY MEDICINE

## 2021-12-27 PROCEDURE — 71045 X-RAY EXAM CHEST 1 VIEW: CPT

## 2021-12-27 PROCEDURE — 85379 FIBRIN DEGRADATION QUANT: CPT | Performed by: EMERGENCY MEDICINE

## 2021-12-27 PROCEDURE — 93005 ELECTROCARDIOGRAM TRACING: CPT

## 2021-12-27 PROCEDURE — 84484 ASSAY OF TROPONIN QUANT: CPT | Performed by: EMERGENCY MEDICINE

## 2021-12-27 PROCEDURE — 36415 COLL VENOUS BLD VENIPUNCTURE: CPT | Performed by: EMERGENCY MEDICINE

## 2021-12-27 RX ADMIN — IOHEXOL 100 ML: 350 INJECTION, SOLUTION INTRAVENOUS at 17:25

## 2021-12-27 NOTE — ED PROVIDER NOTES
History  Chief Complaint   Patient presents with    Chest Pain     patient tested today for COVID, negative  c/o pain under left breast and rib cage  states fell 2 weeks ago and hit left side on step  28-year-old female from urgent care complains of left chest wall discomfort ongoing for the past 3 weeks, fell down couple steps afterward, but had prior  No exertional symptoms or hemoptysis  No history of CAD or catheterization, normal nuc stress in distant past   Aware of thoracic subcutaneous cyst, having resected in near future      History provided by:  Patient  Chest Pain  Pain location:  L lateral chest and L chest  Pain radiates to:  Does not radiate  Pain radiates to the back: no    Pain severity:  Moderate  Onset quality:  Unable to specify  Timing:  Constant  Chronicity:  New  Context: movement    Relieved by: Aleve  Worsened by: Movement  Associated symptoms: no abdominal pain, no fever and no shortness of breath    Risk factors: no coronary artery disease and no prior DVT/PE        None       Past Medical History:   Diagnosis Date    Diabetes mellitus ([facility] [address] )     Disease of thyroid gland     Hypertension        Past Surgical History:   Procedure Laterality Date    CHOLECYSTECTOMY      REPLACEMENT TOTAL KNEE Bilateral        History reviewed  No pertinent family history  I have reviewed and agree with the history as documented  E-Cigarette/Vaping    E-Cigarette Use Never User      E-Cigarette/Vaping Substances     Social History     Tobacco Use    Smoking status: Never Smoker    Smokeless tobacco: Never Used   Vaping Use    Vaping Use: Never used   Substance Use Topics    Alcohol use: Not Currently    Drug use: Never       Review of Systems   Constitutional: Negative for fever  Respiratory: Negative for shortness of breath  Cardiovascular: Positive for chest pain  Gastrointestinal: Negative for abdominal pain  All other systems reviewed and are negative        Physical Exam  Physical Exam  Vitals and nursing note reviewed  Constitutional:       Comments: Pleasant, comfortable-appearing   HENT:      Head: Normocephalic and atraumatic  Eyes:      Conjunctiva/sclera: Conjunctivae normal       Pupils: Pupils are equal, round, and reactive to light  Cardiovascular:      Rate and Rhythm: Normal rate and regular rhythm  Pulses:           Radial pulses are 2+ on the right side and 2+ on the left side  Heart sounds: Normal heart sounds  Pulmonary:      Effort: Pulmonary effort is normal       Breath sounds: Normal breath sounds  Chest:      Chest wall: Tenderness present  Comments: Left anterior inferior lateral/posterior chest wall tender without overlying skin changes or deformity  Abdominal:      General: Bowel sounds are normal  There is no distension  Palpations: Abdomen is soft  Tenderness: There is no abdominal tenderness  Musculoskeletal:         General: Normal range of motion  Cervical back: Neck supple  Skin:     General: Skin is warm and dry  Neurological:      General: No focal deficit present  Mental Status: She is alert and oriented to person, place, and time  Cranial Nerves: No cranial nerve deficit  Coordination: Coordination normal    Psychiatric:         Behavior: Behavior normal          Thought Content:  Thought content normal          Judgment: Judgment normal          Vital Signs  ED Triage Vitals [12/27/21 1447]   Temperature Pulse Respirations Blood Pressure SpO2   (!) 97 2 °F (36 2 °C) 73 20 (!) 173/86 94 %      Temp src Heart Rate Source Patient Position - Orthostatic VS BP Location FiO2 (%)   -- Monitor Sitting Right arm --      Pain Score       7           Vitals:    12/27/21 1447 12/27/21 1503 12/27/21 1715   BP: (!) 173/86 (!) 173/76 163/88   Pulse: 73 68 60   Patient Position - Orthostatic VS: Sitting Lying          Visual Acuity      ED Medications  Medications   iohexol (OMNIPAQUE) 350 MG/ML injection (SINGLE-DOSE) 100 mL (100 mL Intravenous Given 12/27/21 1725)       Diagnostic Studies  Results Reviewed     Procedure Component Value Units Date/Time    HS Troponin 0hr (reflex protocol) [[id number]]  (Normal) Collected: 12/27/21 1523    Lab Status: Final result Specimen: Blood from Arm, Right Updated: 12/27/21 1557     hs TnI 0hr 4 ng/L     D-Dimer [[id number]]  (Abnormal) Collected: 12/27/21 1523    Lab Status: Final result Specimen: Blood from Arm, Right Updated: 12/27/21 1551     D-Dimer, Quant 0 97 ug/ml FEU     Comprehensive metabolic panel [[id number]] Collected: 12/27/21 1524    Lab Status: Final result Specimen: Blood from Arm, Right Updated: 12/27/21 1549     Sodium 140 mmol/L      Potassium 3 8 mmol/L      Chloride 103 mmol/L      CO2 26 mmol/L      ANION GAP 11 mmol/L      BUN 14 mg/dL      Creatinine 0 78 mg/dL      Glucose 113 mg/dL      Calcium 8 8 mg/dL      AST 37 U/L      ALT 45 U/L      Alkaline Phosphatase 104 U/L      Total Protein 7 4 g/dL      Albumin 3 9 g/dL      Total Bilirubin 0 45 mg/dL      eGFR 69 ml/min/1 73sq m     Narrative:      [place] guidelines for Chronic Kidney Disease (CKD):     Stage 1 with normal or high GFR (GFR > 90 mL/min/1 73 square meters)    Stage 2 Mild CKD (GFR = 60-89 mL/min/1 73 square meters)    Stage 3A Moderate CKD (GFR = 45-59 mL/min/1 73 square meters)    Stage 3B Moderate CKD (GFR = 30-44 mL/min/1 73 square meters)    Stage 4 Severe CKD (GFR = 15-29 mL/min/1 73 square meters)    Stage 5 End Stage CKD (GFR <15 mL/min/1 73 square meters)  Note: GFR calculation is accurate only with a steady state creatinine    CBC and differential [[id number]] Collected: 12/27/21 1523    Lab Status: Final result Specimen: Blood from Arm, Right Updated: 12/27/21 1528     WBC 7 45 Thousand/uL      RBC 4 66 Million/uL      Hemoglobin 14 0 g/dL      Hematocrit 42 7 %      MCV 92 fL      MCH 30 0 pg      MCHC 32 8 g/dL      RDW 13 1 %      MPV 10 8 fL      Platelets 366 Thousands/uL      nRBC 0 /100 WBCs      Neutrophils Relative 51 %      Immat GRANS % 1 %      Lymphocytes Relative 33 %      Monocytes Relative 12 %      Eosinophils Relative 2 %      Basophils Relative 1 %      Neutrophils Absolute 3 84 Thousands/µL      Immature Grans Absolute 0 04 Thousand/uL      Lymphocytes Absolute 2 46 Thousands/µL      Monocytes Absolute 0 89 Thousand/µL      Eosinophils Absolute 0 14 Thousand/µL      Basophils Absolute 0 08 Thousands/µL                  CTA ED chest PE study   Final Result by Sherrie De La Paz MD (12/27 1805)      No pulmonary embolism  Minor right basilar atelectasis  Large subcutaneous lesion in the posterior soft tissues at approximately T4 measuring 2 7 x 2 1 cm likely a sebaceous cyst                   Workstation performed: [id number]         XR chest 1 view portable   Final Result by Alice Bradshaw MD (12/27 1627)      No acute cardiopulmonary disease  Workstation performed: [id number]                    Procedures  Procedures         ED Course  ED Course as of 12/28/21 0742   Mon Dec 27, 2021   1518 EKG 2:50 p m  Normal sinus rhythm rate 71 left axis right bundle/incomplete block no ST elevation or depression interpreted by me   1536 WBC: 7 45   1630 hs TnI 0hr: 4   1630 Creatinine: 0 78   1630 D-Dimer, Quant(!): 0 97   Tue Dec 28, 2021   0741 We discussed results, provided a copy and she voices understanding and agreeable with close outpatient follow-up, family present                               SBIRT 22yo+      Most Recent Value   SBIRT (24 yo +)    In order to provide better care to our patients, we are screening all of our patients for alcohol and drug use  Would it be okay to ask you these screening questions? Yes Filed at: 12/27/2021 1607   Initial Alcohol Screen: US AUDIT-C     1  How often do you have a drink containing alcohol? 0 Filed at: 12/27/2021 1607   2   How many drinks containing alcohol do you have on a typical day you are drinking? 0 Filed at: 12/27/2021 1607   3a  Male UNDER 65: How often do you have five or more drinks on one occasion? 0 Filed at: 12/27/2021 1607   3b  FEMALE Any Age, or MALE 65+: How often do you have 4 or more drinks on one occassion? 0 Filed at: 12/27/2021 1607   Audit-C Score 0 Filed at: 12/27/2021 1607   DOM: How many times in the past year have you    Used an illegal drug or used a prescription medication for non-medical reasons? Never Filed at: 12/27/2021 1607                    MDM    Disposition  Final diagnoses:   Chest wall pain     Time reflects when diagnosis was documented in both MDM as applicable and the Disposition within this note     Time User Action Codes Description Comment    12/27/2021  6:17 PM Quynh Hassan Add [R07 89] Chest wall pain       ED Disposition     ED Disposition Condition Date/Time Comment    Discharge Stable Mon Dec 27, 2021  6:16 PM Gloria Arrington discharge to home/self care  Follow-up Information     Follow up With Specialties Details Why Contact Milly Conklin DO Family Medicine Schedule an appointment as soon as possible for a visit in 1 week  [facility]  [phone]            There are no discharge medications for this patient  No discharge procedures on file      PDMP Review     None          ED Provider  Electronically Signed by           Rosetta Alfredo DO  12/28/21 6507

## 2021-12-27 NOTE — DISCHARGE INSTRUCTIONS
Probable sebaceous cyst  Return immediately if worse or any new symptoms  Tylenol 1000 mg every 6 hours as needed  and/or  Advil 400 mg every 6 hours as needed  May take both together

## 2021-12-28 LAB
ATRIAL RATE: 71 BPM
P AXIS: 51 DEGREES
PR INTERVAL: 242 MS
QRS AXIS: -61 DEGREES
QRSD INTERVAL: 120 MS
QT INTERVAL: 426 MS
QTC INTERVAL: 462 MS
T WAVE AXIS: 84 DEGREES
VENTRICULAR RATE: 71 BPM

## 2022-01-05 ENCOUNTER — APPOINTMENT (EMERGENCY)
Dept: RADIOLOGY | Facility: HOSPITAL | Age: 87
End: 2022-01-05
Payer: COMMERCIAL

## 2022-01-05 ENCOUNTER — HOSPITAL ENCOUNTER (EMERGENCY)
Facility: HOSPITAL | Age: 87
Discharge: HOME | End: 2022-01-05
Attending: EMERGENCY MEDICINE | Admitting: EMERGENCY MEDICINE
Payer: COMMERCIAL

## 2022-01-05 VITALS
RESPIRATION RATE: 20 BRPM | DIASTOLIC BLOOD PRESSURE: 75 MMHG | TEMPERATURE: 96.8 F | HEART RATE: 72 BPM | SYSTOLIC BLOOD PRESSURE: 164 MMHG | OXYGEN SATURATION: 94 %

## 2022-01-05 DIAGNOSIS — R07.89 CHEST WALL PAIN: ICD-10-CM

## 2022-01-05 DIAGNOSIS — R11.2 NAUSEA AND VOMITING: Primary | ICD-10-CM

## 2022-01-05 LAB
ALBUMIN SERPL BCP-MCNC: 4.4 G/DL (ref 3.5–5)
ALP SERPL-CCNC: 98 U/L (ref 46–116)
ALT SERPL W P-5'-P-CCNC: 41 U/L (ref 12–78)
ANION GAP SERPL CALCULATED.3IONS-SCNC: 11 MMOL/L (ref 4–13)
AST SERPL W P-5'-P-CCNC: 36 U/L (ref 5–45)
BASOPHILS # BLD AUTO: 0.06 THOUSANDS/ΜL (ref 0–0.1)
BASOPHILS NFR BLD AUTO: 1 % (ref 0–1)
BILIRUB SERPL-MCNC: 0.84 MG/DL (ref 0.2–1)
BUN SERPL-MCNC: 19 MG/DL (ref 5–25)
CALCIUM SERPL-MCNC: 9.3 MG/DL (ref 8.3–10.1)
CARDIAC TROPONIN I PNL SERPL HS: 6 NG/L
CHLORIDE SERPL-SCNC: 95 MMOL/L (ref 100–108)
CO2 SERPL-SCNC: 25 MMOL/L (ref 21–32)
CREAT SERPL-MCNC: 1.02 MG/DL (ref 0.6–1.3)
EOSINOPHIL # BLD AUTO: 0.01 THOUSAND/ΜL (ref 0–0.61)
EOSINOPHIL NFR BLD AUTO: 0 % (ref 0–6)
ERYTHROCYTE [DISTWIDTH] IN BLOOD BY AUTOMATED COUNT: 12.7 % (ref 11.6–15.1)
GFR SERPL CREATININE-BSD FRML MDRD: 50 ML/MIN/1.73SQ M
GLUCOSE SERPL-MCNC: 172 MG/DL (ref 65–140)
HCT VFR BLD AUTO: 44.4 % (ref 34.8–46.1)
HGB BLD-MCNC: 14.8 G/DL (ref 11.5–15.4)
IMM GRANULOCYTES # BLD AUTO: 0.06 THOUSAND/UL (ref 0–0.2)
IMM GRANULOCYTES NFR BLD AUTO: 1 % (ref 0–2)
LYMPHOCYTES # BLD AUTO: 3.12 THOUSANDS/ΜL (ref 0.6–4.47)
LYMPHOCYTES NFR BLD AUTO: 31 % (ref 14–44)
MCH RBC QN AUTO: 29.5 PG (ref 26.8–34.3)
MCHC RBC AUTO-ENTMCNC: 33.3 G/DL (ref 31.4–37.4)
MCV RBC AUTO: 89 FL (ref 82–98)
MONOCYTES # BLD AUTO: 0.96 THOUSAND/ΜL (ref 0.17–1.22)
MONOCYTES NFR BLD AUTO: 10 % (ref 4–12)
NEUTROPHILS # BLD AUTO: 5.9 THOUSANDS/ΜL (ref 1.85–7.62)
NEUTS SEG NFR BLD AUTO: 57 % (ref 43–75)
NRBC BLD AUTO-RTO: 0 /100 WBCS
PLATELET # BLD AUTO: 201 THOUSANDS/UL (ref 149–390)
PMV BLD AUTO: 11.2 FL (ref 8.9–12.7)
POTASSIUM SERPL-SCNC: 3.6 MMOL/L (ref 3.5–5.3)
PROT SERPL-MCNC: 8 G/DL (ref 6.4–8.2)
RBC # BLD AUTO: 5.01 MILLION/UL (ref 3.81–5.12)
SODIUM SERPL-SCNC: 131 MMOL/L (ref 136–145)
WBC # BLD AUTO: 10.11 THOUSAND/UL (ref 4.31–10.16)

## 2022-01-05 PROCEDURE — 99285 EMERGENCY DEPT VISIT HI MDM: CPT | Performed by: EMERGENCY MEDICINE

## 2022-01-05 PROCEDURE — 93005 ELECTROCARDIOGRAM TRACING: CPT

## 2022-01-05 PROCEDURE — 36415 COLL VENOUS BLD VENIPUNCTURE: CPT

## 2022-01-05 PROCEDURE — 84484 ASSAY OF TROPONIN QUANT: CPT | Performed by: EMERGENCY MEDICINE

## 2022-01-05 PROCEDURE — 96361 HYDRATE IV INFUSION ADD-ON: CPT

## 2022-01-05 PROCEDURE — 71045 X-RAY EXAM CHEST 1 VIEW: CPT

## 2022-01-05 PROCEDURE — 96374 THER/PROPH/DIAG INJ IV PUSH: CPT

## 2022-01-05 PROCEDURE — 80053 COMPREHEN METABOLIC PANEL: CPT | Performed by: EMERGENCY MEDICINE

## 2022-01-05 PROCEDURE — 99285 EMERGENCY DEPT VISIT HI MDM: CPT

## 2022-01-05 PROCEDURE — 96375 TX/PRO/DX INJ NEW DRUG ADDON: CPT

## 2022-01-05 PROCEDURE — 85025 COMPLETE CBC W/AUTO DIFF WBC: CPT | Performed by: EMERGENCY MEDICINE

## 2022-01-05 RX ORDER — OXYCODONE HYDROCHLORIDE 5 MG/1
2.5 TABLET ORAL EVERY 6 HOURS PRN
Qty: 4 TABLET | Refills: 0 | Status: SHIPPED | OUTPATIENT
Start: 2022-01-05 | End: 2022-01-08

## 2022-01-05 RX ORDER — ONDANSETRON 2 MG/ML
4 INJECTION INTRAMUSCULAR; INTRAVENOUS ONCE
Status: COMPLETED | OUTPATIENT
Start: 2022-01-05 | End: 2022-01-05

## 2022-01-05 RX ORDER — METOCLOPRAMIDE HYDROCHLORIDE 5 MG/ML
10 INJECTION INTRAMUSCULAR; INTRAVENOUS ONCE
Status: COMPLETED | OUTPATIENT
Start: 2022-01-05 | End: 2022-01-05

## 2022-01-05 RX ORDER — METOCLOPRAMIDE 10 MG/1
5 TABLET ORAL 3 TIMES DAILY PRN
Qty: 8 TABLET | Refills: 0 | Status: SHIPPED | OUTPATIENT
Start: 2022-01-05 | End: 2022-01-10

## 2022-01-05 RX ORDER — OXYCODONE HYDROCHLORIDE 5 MG/1
2.5 TABLET ORAL ONCE
Status: COMPLETED | OUTPATIENT
Start: 2022-01-05 | End: 2022-01-05

## 2022-01-05 RX ORDER — PROCHLORPERAZINE MALEATE 5 MG/1
5 TABLET ORAL ONCE
Status: COMPLETED | OUTPATIENT
Start: 2022-01-05 | End: 2022-01-05

## 2022-01-05 RX ORDER — DIPHENHYDRAMINE HYDROCHLORIDE 50 MG/ML
12.5 INJECTION INTRAMUSCULAR; INTRAVENOUS ONCE
Status: COMPLETED | OUTPATIENT
Start: 2022-01-05 | End: 2022-01-05

## 2022-01-05 RX ADMIN — ONDANSETRON 4 MG: 2 INJECTION INTRAMUSCULAR; INTRAVENOUS at 18:44

## 2022-01-05 RX ADMIN — PROCHLORPERAZINE MALEATE 5 MG: 5 TABLET ORAL at 20:28

## 2022-01-05 RX ADMIN — OXYCODONE HYDROCHLORIDE 2.5 MG: 5 TABLET ORAL at 18:45

## 2022-01-05 RX ADMIN — METOCLOPRAMIDE HYDROCHLORIDE 10 MG: 5 INJECTION INTRAMUSCULAR; INTRAVENOUS at 21:17

## 2022-01-05 RX ADMIN — SODIUM CHLORIDE 1000 ML: 0.9 INJECTION, SOLUTION INTRAVENOUS at 18:47

## 2022-01-05 RX ADMIN — DIPHENHYDRAMINE HYDROCHLORIDE 12.5 MG: 50 INJECTION, SOLUTION INTRAMUSCULAR; INTRAVENOUS at 21:17

## 2022-01-06 LAB
ATRIAL RATE: 69 BPM
P AXIS: 40 DEGREES
PR INTERVAL: 240 MS
QRS AXIS: -68 DEGREES
QRSD INTERVAL: 122 MS
QT INTERVAL: 452 MS
QTC INTERVAL: 484 MS
T WAVE AXIS: 72 DEGREES
VENTRICULAR RATE: 69 BPM

## 2022-01-06 PROCEDURE — 93010 ELECTROCARDIOGRAM REPORT: CPT | Performed by: INTERNAL MEDICINE

## 2022-01-06 NOTE — ED PROVIDER NOTES
Final Diagnosis:  1  Nausea and vomiting    2  Chest wall pain        Chief Complaint   Patient presents with    Shortness of Breath     Pt presents to ER from home for ongoing issues - fall one week ago with a possible back injury  Now on hydrocodone and has been having trouble eating and drinking over last two days, overall body pain including chest pain, reports shortness of breath and is tachpyneic upon arrival  Recently tested for covid 1 week ago, negative   Chest Pain     HPI  Patient presents for evaluation nausea and vomiting  Patient  provides history  Approximately a week to week and half ago the patient had a fall where she struck her right elbow and leg  She was then seen at another facility couple days later for right-sided back pain  An evaluation was done there including a CT scan PE study which was negative for any acute pathology  They were then informed that the pain was musculoskeletal in nature  Discharged home  She then continued to have right-sided back pain  Reach out to her primary care provider that prescribed her Tylenol with codeine  She started taking that  Over the following days she developed nausea with the occasional episode of vomiting  Denies any abdominal pain, constipation, or diarrhea  She believes that she may be having a reaction to the medicine as she had 1 a long time ago when she was on codeine but thought that she probably no longer had 1  Unless otherwise specified:  - No language barrier    - History obtained from patient  - There are no limitations to the history obtained  - Previous charting was reviewed    PMH:   has a past medical history of Diabetes mellitus ([address] ), Disease of thyroid gland, and Hypertension  PSH:   has a past surgical history that includes Replacement total knee (Bilateral) and Cholecystectomy  ROS:  Review of Systems   -   - 13 point ROS was performed and all are normal unless stated in the history above  - Nursing note reviewed  Vitals reviewed  - Orders placed by myself and/or advanced practitioner / resident  PE:   Vitals:    01/05/22 1750 01/05/22 1830 01/05/22 2000 01/05/22 2115   BP:  (!) 202/86 (!) 181/77 164/75   BP Location:   Left arm Left arm   Pulse:  68 69 72   Resp:  (!) 24 (!) 23 20   Temp: (!) 96 8 °F (36 °C)      TempSrc: Temporal      SpO2: 95% 94% 92% 94%     Vitals reviewed by me  Patient appears nondistressed sitting in bed  No obvious signs of trauma across back or abdomen  Mild tenderness with palpation over right upper back  Unless otherwise specified above:    General: VS reviewed  Appears in NAD    Head: Normocephalic, atraumatic  Eyes: EOM-I  No exudate  ENT: Atraumatic external nose and ears  No malocclusion  No stridor  No drooling  Neck: No JVD  CV: No pallor noted  Lungs:   No tachypnea  No respiratory distress    Abdomen:  Soft, non-tender, non-distended    MSK:   FROM spontaneously  No obvious deformity    Skin: Dry, intact  No obvious rash  Neuro: Awake, alert, GCS15, CN II-XII grossly intact  Speaking in full sentences  Motor grossly intact  Psychiatric/Behavioral: Appropriate mood and affect   Exam: deferred    Physical Exam     Procedures   A:  - Nursing note reviewed  HEART Risk Score      Most Recent Value   Heart Score Risk Calculator    History 0 Filed at: 01/05/2022 1954   ECG 0 Filed at: 01/05/2022 1954   Age 2 Filed at: 01/05/2022 1954   Risk Factors 1 Filed at: 01/05/2022 1954   Troponin 0 Filed at: 01/05/2022 1954   HEART Score 3 Filed at: 01/05/2022 1954                     ED Course as of 01/05/22 2211 Wed Jan 05, 2022 1943 Patient re-evaluated and informed of results  Still having some nausea  Will try alternate medication     1953 Procedure Note: EKG  Date/Time: 01/05/22 7:53 PM   Interpreted by: Mirian Koroma  Indications / Diagnosis: CP  ECG reviewed by me, the ED Provider: yes   The EKG demonstrates:  Rhythm: normal sinus  Intervals: normal intervals  Axis: normal axis  QRS/Blocks: normal QRS  ST Changes:  Nonspecific block, No acute ST Changes, no STD/DONTRELL  No diffuse elevations to indicate pericarditis  No coved ST elevations greater than 2mm with negative T waves in V1-3 to indicate concern for brugada  No biphasic T waves in V2, V3 to indicate Wellens (critical stenosis of LAD)  No elevation in aVR or deviation when compared to V1 (can be associated with ST depression in I,II, V4-6 when left main occlusion is present)  2112 Patient with continued nausea     XR chest 1 view portable   ED Interpretation   No acute fracture, pneumothorax  Overall appears consistent to prior        Orders Placed This Encounter   Procedures    XR chest 1 view portable    CBC and differential    Comprehensive metabolic panel    HS Troponin 0hr (reflex protocol)    Notify Physician: Increase in chest pain, symptomatic hypotension, or change in cardiac rhythm, or O2 less than 90%    Insert peripheral IV    Continuous cardiac monitoring    Continuous pulse oximetry    ECG 12 lead     Labs Reviewed   COMPREHENSIVE METABOLIC PANEL - Abnormal       Result Value Ref Range Status    Sodium 131 (*) 136 - 145 mmol/L Final    Potassium 3 6  3 5 - 5 3 mmol/L Final    Chloride 95 (*) 100 - 108 mmol/L Final    CO2 25  21 - 32 mmol/L Final    ANION GAP 11  4 - 13 mmol/L Final    BUN 19  5 - 25 mg/dL Final    Creatinine 1 02  0 60 - 1 30 mg/dL Final    Comment: Standardized to IDMS reference method    Glucose 172 (*) 65 - 140 mg/dL Final    Comment: If the patient is fasting, the ADA then defines impaired fasting glucose as > 100 mg/dL and diabetes as > or equal to 123 mg/dL  Specimen collection should occur prior to Sulfasalazine administration due to the potential for falsely depressed results  Specimen collection should occur prior to Sulfapyridine administration due to the potential for falsely elevated results      Calcium 9 3 8 3 - 10 1 mg/dL Final    AST 36  5 - 45 U/L Final    Comment: Specimen collection should occur prior to Sulfasalazine administration due to the potential for falsely depressed results  ALT 41  12 - 78 U/L Final    Comment: Specimen collection should occur prior to Sulfasalazine administration due to the potential for falsely depressed results  Alkaline Phosphatase 98  46 - 116 U/L Final    Total Protein 8 0  6 4 - 8 2 g/dL Final    Albumin 4 4  3 5 - 5 0 g/dL Final    Total Bilirubin 0 84  0 20 - 1 00 mg/dL Final    Comment: Use of this assay is not recommended for patients undergoing treatment with eltrombopag due to the potential for falsely elevated results  eGFR 50  ml/min/1 73sq m Final    Narrative:     [place] guidelines for Chronic Kidney Disease (CKD):     Stage 1 with normal or high GFR (GFR > 90 mL/min/1 73 square meters)    Stage 2 Mild CKD (GFR = 60-89 mL/min/1 73 square meters)    Stage 3A Moderate CKD (GFR = 45-59 mL/min/1 73 square meters)    Stage 3B Moderate CKD (GFR = 30-44 mL/min/1 73 square meters)    Stage 4 Severe CKD (GFR = 15-29 mL/min/1 73 square meters)    Stage 5 End Stage CKD (GFR <15 mL/min/1 73 square meters)  Note: GFR calculation is accurate only with a steady state creatinine   HS TROPONIN I 0HR - Normal    hs TnI 0hr 6  "Refer to ACS Flowchart"- see link ng/L Final    Comment:                                              Initial (time 0) result  If >=50 ng/L, Myocardial injury suggested ;  Type of myocardial injury and treatment strategy  to be determined  If 5-49 ng/L, a delta result at 2 hours and or 4 hours will be needed to further evaluate  If <4 ng/L, and chest pain has been >3 hours since onset, patient may qualify for discharge based on the HEART score in the ED  If <5 ng/L and <3hours since onset of chest pain, a delta result at 2 hours will be needed to further evaluate      Second Troponin (time 2 hours)  If calculated delta >= 20 ng/L,  Myocardial injury suggested ; Type of myocardial injury and treatment strategy to be determined  If 5-49 ng/L and the calculated delta is 5-19 ng/L, consult medical service for evaluation  Continue evaluation for ischemia on ecg and other possible etiology and repeat hs troponin at 4 hours  If delta is <5 ng/L at 2 hours, consider discharge based on risk stratification via the HEART score (if in ED), or ALEIDA risk score in IP/Observation  CBC AND DIFFERENTIAL    WBC 10 11  4 31 - 10 16 Thousand/uL Final    RBC 5 01  3 81 - 5 12 Million/uL Final    Hemoglobin 14 8  11 5 - 15 4 g/dL Final    Hematocrit 44 4  34 8 - 46 1 % Final    MCV 89  82 - 98 fL Final    MCH 29 5  26 8 - 34 3 pg Final    MCHC 33 3  31 4 - 37 4 g/dL Final    RDW 12 7  11 6 - 15 1 % Final    MPV 11 2  8 9 - 12 7 fL Final    Platelets 835  475 - 390 Thousands/uL Final    nRBC 0  /100 WBCs Final    Neutrophils Relative 57  43 - 75 % Final    Immat GRANS % 1  0 - 2 % Final    Lymphocytes Relative 31  14 - 44 % Final    Monocytes Relative 10  4 - 12 % Final    Eosinophils Relative 0  0 - 6 % Final    Basophils Relative 1  0 - 1 % Final    Neutrophils Absolute 5 90  1 85 - 7 62 Thousands/µL Final    Immature Grans Absolute 0 06  0 00 - 0 20 Thousand/uL Final    Lymphocytes Absolute 3 12  0 60 - 4 47 Thousands/µL Final    Monocytes Absolute 0 96  0 17 - 1 22 Thousand/µL Final    Eosinophils Absolute 0 01  0 00 - 0 61 Thousand/µL Final    Basophils Absolute 0 06  0 00 - 0 10 Thousands/µL Final         Final Diagnosis:  1  Nausea and vomiting    2  Chest wall pain        P:  - laboratory analysis was unremarkable  I discussed with patient and  that her symptoms are likely secondary to her medication use  Advised him to stop taking this medicine  Tried oral pain control here in the emergency department with some improvement of pain  -patient had resolution of symptoms with Reglan and Benadryl    Will provide prescriptions for Reglan as well as the short course of narcotics when needed  Discussed results with family it was bedside  Return precautions discussed  Medications   sodium chloride 0 9 % bolus 1,000 mL (0 mL Intravenous Stopped 1/5/22 2027)   ondansetron (ZOFRAN) injection 4 mg (4 mg Intravenous Given 1/5/22 1844)   oxyCODONE (ROXICODONE) IR tablet 2 5 mg (2 5 mg Oral Given 1/5/22 1845)   prochlorperazine (COMPAZINE) tablet 5 mg (5 mg Oral Given 1/5/22 2028)   diphenhydrAMINE (BENADRYL) injection 12 5 mg (12 5 mg Intravenous Given 1/5/22 2117)   metoclopramide (REGLAN) injection 10 mg (10 mg Intravenous Given 1/5/22 2117)     Time reflects when diagnosis was documented in both MDM as applicable and the Disposition within this note     Time User Action Codes Description Comment    1/5/2022  9:58 PM Wendy Harvey Add [R11 2] Nausea and vomiting     1/5/2022  9:58 PM Deshawn Joyner Add [R07 89] Chest wall pain       ED Disposition     ED Disposition Condition Date/Time Comment    Discharge Stable Wed Jan 5, 2022  9:58 PM Kam Garcia discharge to home/self care  Follow-up Information     Follow up With Specialties Details Why Contact Info    Robert Moreno DO Family Medicine   [address] [place] [address]  [phone]          Patient's Medications   Discharge Prescriptions    METOCLOPRAMIDE (REGLAN) 10 MG TABLET    Take 0 5 tablets (5 mg total) by mouth 3 (three) times a day as needed (Nausea) for up to 5 days       Start Date: 1/5/2022  End Date: 1/10/2022       Order Dose: 5 mg       Quantity: 8 tablet    Refills: 0    OXYCODONE (ROXICODONE) 5 IMMEDIATE RELEASE TABLET    Take 0 5 tablets (2 5 mg total) by mouth every 6 (six) hours as needed for severe pain for up to 3 days Max Daily Amount: 10 mg       Start Date: 1/5/2022  End Date: 1/8/2022       Order Dose: 2 5 mg       Quantity: 4 tablet    Refills: 0     No discharge procedures on file    None       Portions of the record may have been created with voice recognition software  Occasional wrong word or "sound a like" substitutions may have occurred due to the inherent limitations of voice recognition software  Read the chart carefully and recognize, using context, where substitutions have occurred      Electronically signed by:  MD Felipe Feldman MD  01/05/22 5770

## 2022-01-25 ENCOUNTER — DOCTOR'S OFFICE (OUTPATIENT)
Dept: URBAN - NONMETROPOLITAN AREA CLINIC 1 | Facility: CLINIC | Age: 87
Setting detail: OPHTHALMOLOGY
End: 2022-01-25
Payer: COMMERCIAL

## 2022-01-25 ENCOUNTER — RX ONLY (RX ONLY)
Age: 87
End: 2022-01-25

## 2022-01-25 DIAGNOSIS — H16.221: ICD-10-CM

## 2022-01-25 DIAGNOSIS — H02.831: ICD-10-CM

## 2022-01-25 DIAGNOSIS — H02.834: ICD-10-CM

## 2022-01-25 DIAGNOSIS — H02.413: ICD-10-CM

## 2022-01-25 DIAGNOSIS — H16.222: ICD-10-CM

## 2022-01-25 DIAGNOSIS — H16.223: ICD-10-CM

## 2022-01-25 DIAGNOSIS — H02.403: ICD-10-CM

## 2022-01-25 PROCEDURE — 92012 INTRM OPH EXAM EST PATIENT: CPT | Performed by: OPHTHALMOLOGY

## 2022-01-25 PROCEDURE — 68761 CLOSE TEAR DUCT OPENING: CPT | Performed by: OPHTHALMOLOGY

## 2022-01-25 PROCEDURE — 83861 MICROFLUID ANALY TEARS: CPT | Performed by: OPHTHALMOLOGY

## 2022-01-25 ASSESSMENT — PUNCTA - ASSESSMENT
OS_PUNCTA: COL PLUG LLL
OD_PUNCTA: COL PLUG RLL

## 2022-01-25 ASSESSMENT — REFRACTION_CURRENTRX
OD_VPRISM_DIRECTION: SV
OS_CYLINDER: -2.00
OD_OVR_VA: 20/
OD_AXIS: 61
OS_SPHERE: +3.75
OD_AXIS: 99
OS_OVR_VA: 20/
OD_ADD: +3.00
OS_AXIS: 75
OS_OVR_VA: 20/
OS_CYLINDER: -1.00
OD_SPHERE: +3.25
OS_VPRISM_DIRECTION: SV
OS_AXIS: 43
OD_CYLINDER: -1.00
OD_CYLINDER: -0.50
OS_SPHERE: +0.25
OD_OVR_VA: 20/
OD_VPRISM_DIRECTION: BF
OS_ADD: +3.00
OS_VPRISM_DIRECTION: BF
OD_SPHERE: +0.75

## 2022-01-25 ASSESSMENT — SPHEQUIV_DERIVED
OD_SPHEQUIV: 0.125
OD_SPHEQUIV: 0.375
OS_SPHEQUIV: -0.25

## 2022-01-25 ASSESSMENT — LID POSITION - PTOSIS
OS_PTOSIS: 1+
OD_PTOSIS: 1+

## 2022-01-25 ASSESSMENT — REFRACTION_MANIFEST
OD_VA2: 20/40
OD_HPRISM: 1.5
OS_VA2: 20/50+2
OS_VA1: 20/50+2
OS_SPHERE: PLANO
OD_VPRISM_DIRECTION: BI
OD_VA1: 20/40
OS_VPRISM: BU
OS_CYLINDER: -1.50
OS_ADD: +2.75
OS_AXIS: 070
OD_ADD: +2.75
OD_CYLINDER: -0.75
OD_SPHERE: +0.50
OD_AXIS: 070

## 2022-01-25 ASSESSMENT — REFRACTION_AUTOREFRACTION
OS_CYLINDER: -3.00
OD_SPHERE: +1.75
OD_AXIS: 35
OS_SPHERE: +1.25
OD_CYLINDER: -2.75
OS_AXIS: 69

## 2022-01-25 ASSESSMENT — CORNEAL DYSTROPHY - POSTERIOR
OD_POSTERIORDYSTROPHY: 2+ 3+ GUTTATA
OS_POSTERIORDYSTROPHY: 2+ 3+ GUTTATA

## 2022-01-25 ASSESSMENT — SUPERFICIAL PUNCTATE KERATITIS (SPK)
OS_SPK: 1+ 2+
OD_SPK: 1+ 2+

## 2022-01-25 ASSESSMENT — DRY EYES - PHYSICIAN NOTES
OS_GENERALCOMMENTS: MODERATE SEVERE
OD_GENERALCOMMENTS: MODERATE SEVERE

## 2022-01-25 ASSESSMENT — CONFRONTATIONAL VISUAL FIELD TEST (CVF)
OD_FINDINGS: FULL
OS_FINDINGS: FULL

## 2022-01-25 ASSESSMENT — LID POSITION - DERMATOCHALASIS
OS_DERMATOCHALASIS: 2+
OD_DERMATOCHALASIS: 2+

## 2022-01-25 ASSESSMENT — VISUAL ACUITY
OS_BCVA: 20/50
OD_BCVA: 20/60-2

## 2022-06-07 ENCOUNTER — DOCTOR'S OFFICE (OUTPATIENT)
Dept: URBAN - NONMETROPOLITAN AREA CLINIC 1 | Facility: CLINIC | Age: 87
Setting detail: OPHTHALMOLOGY
End: 2022-06-07
Payer: COMMERCIAL

## 2022-06-07 DIAGNOSIS — H02.831: ICD-10-CM

## 2022-06-07 DIAGNOSIS — H16.221: ICD-10-CM

## 2022-06-07 DIAGNOSIS — H02.834: ICD-10-CM

## 2022-06-07 DIAGNOSIS — H18.70: ICD-10-CM

## 2022-06-07 DIAGNOSIS — H02.403: ICD-10-CM

## 2022-06-07 DIAGNOSIS — H16.222: ICD-10-CM

## 2022-06-07 PROBLEM — H52.221 ASTIGMATISM, REGULAR; RIGHT EYE, LEFT EYE: Status: ACTIVE | Noted: 2021-11-30

## 2022-06-07 PROBLEM — H02.433 PTOSIS; BOTH EYES: Status: ACTIVE | Noted: 2021-04-09

## 2022-06-07 PROBLEM — H52.222 ASTIGMATISM, REGULAR; RIGHT EYE, LEFT EYE: Status: ACTIVE | Noted: 2021-11-30

## 2022-06-07 PROBLEM — E11.9 DIABETES TYPE 2 NO RETINOPATHY: Status: ACTIVE | Noted: 2017-06-06

## 2022-06-07 PROBLEM — H43.811 POST VITREOUS DETACHMENT ; RIGHT EYE: Status: ACTIVE | Noted: 2017-06-06

## 2022-06-07 PROCEDURE — 92014 COMPRE OPH EXAM EST PT 1/>: CPT | Performed by: OPHTHALMOLOGY

## 2022-06-07 PROCEDURE — 83861 MICROFLUID ANALY TEARS: CPT | Performed by: OPHTHALMOLOGY

## 2022-06-07 ASSESSMENT — PUNCTA - ASSESSMENT
OD_PUNCTA: COL PLUG RLL
OS_PUNCTA: COL PLUG LLL

## 2022-06-07 ASSESSMENT — LID POSITION - DERMATOCHALASIS
OD_DERMATOCHALASIS: 2+
OS_DERMATOCHALASIS: 2+

## 2022-06-07 ASSESSMENT — LID POSITION - PTOSIS
OD_PTOSIS: 1+
OS_PTOSIS: 1+

## 2022-06-09 ASSESSMENT — REFRACTION_MANIFEST
OS_AXIS: 070
OD_VA1: 20/40
OD_VPRISM_DIRECTION: BI
OD_ADD: +2.75
OD_CYLINDER: -0.75
OD_VA2: 20/40
OS_ADD: +2.75
OS_SPHERE: PLANO
OD_AXIS: 070
OS_VPRISM: BU
OS_CYLINDER: -1.50
OS_VA1: 20/50+2
OD_HPRISM: 1.5
OS_VA2: 20/50+2
OD_SPHERE: +0.50

## 2022-06-09 ASSESSMENT — REFRACTION_CURRENTRX
OS_OVR_VA: 20/
OD_OVR_VA: 20/
OD_ADD: +3.00
OD_AXIS: 61
OS_AXIS: 43
OS_VPRISM_DIRECTION: SV
OS_VPRISM_DIRECTION: BF
OD_SPHERE: +3.25
OS_CYLINDER: -1.00
OD_CYLINDER: -0.50
OD_VPRISM_DIRECTION: BF
OS_ADD: +3.00
OS_OVR_VA: 20/
OS_SPHERE: +0.25
OD_OVR_VA: 20/
OD_CYLINDER: -1.00
OD_SPHERE: +0.75
OD_VPRISM_DIRECTION: SV
OS_AXIS: 75
OD_AXIS: 99
OS_SPHERE: +3.75
OS_CYLINDER: -2.00

## 2022-06-09 ASSESSMENT — REFRACTION_AUTOREFRACTION
OD_AXIS: 045
OD_CYLINDER: -1.25
OD_SPHERE: +0.25
OS_SPHERE: -0.50
OS_CYLINDER: -1.25
OS_AXIS: 056

## 2022-06-09 ASSESSMENT — SPHEQUIV_DERIVED
OD_SPHEQUIV: 0.125
OS_SPHEQUIV: -1.125
OD_SPHEQUIV: -0.375

## 2022-06-09 ASSESSMENT — DRY EYES - PHYSICIAN NOTES
OD_GENERALCOMMENTS: MODERATE SEVERE
OS_GENERALCOMMENTS: MODERATE SEVERE

## 2022-06-09 ASSESSMENT — VISUAL ACUITY
OS_BCVA: 20/70+2
OD_BCVA: 20/70

## 2022-06-09 ASSESSMENT — SUPERFICIAL PUNCTATE KERATITIS (SPK)
OS_SPK: 1+ 2+
OD_SPK: 1+ 2+

## 2022-06-09 ASSESSMENT — CORNEAL DYSTROPHY - POSTERIOR
OD_POSTERIORDYSTROPHY: 2+ 3+ GUTTATA
OS_POSTERIORDYSTROPHY: 2+ 3+ GUTTATA

## 2022-07-29 ENCOUNTER — HOSPITAL ENCOUNTER (OUTPATIENT)
Dept: CT IMAGING | Facility: HOSPITAL | Age: 87
Discharge: HOME | End: 2022-07-29
Payer: COMMERCIAL

## 2022-07-29 DIAGNOSIS — R05.9 COUGH: ICD-10-CM

## 2022-07-29 PROCEDURE — 71250 CT THORAX DX C-: CPT

## 2022-07-29 PROCEDURE — G1004 CDSM NDSC: HCPCS

## 2022-08-16 ENCOUNTER — CONSULT (OUTPATIENT)
Dept: PULMONOLOGY | Facility: CLINIC | Age: 87
End: 2022-08-16
Payer: COMMERCIAL

## 2022-08-16 VITALS
SYSTOLIC BLOOD PRESSURE: 128 MMHG | TEMPERATURE: 96.7 F | DIASTOLIC BLOOD PRESSURE: 67 MMHG | BODY MASS INDEX: 32.13 KG/M2 | HEART RATE: 67 BPM | OXYGEN SATURATION: 94 % | HEIGHT: 64 IN | WEIGHT: 188.2 LBS

## 2022-08-16 DIAGNOSIS — Q25.79 PULMONARY ARTERY ABNORMALITY: Primary | ICD-10-CM

## 2022-08-16 PROBLEM — E78.5 HYPERLIPEMIA: Status: ACTIVE | Noted: 2022-08-16

## 2022-08-16 PROBLEM — E11.9 DIABETES MELLITUS, TYPE 2 (HCC): Status: ACTIVE | Noted: 2022-08-16

## 2022-08-16 PROBLEM — I10 HYPERTENSION: Status: ACTIVE | Noted: 2022-08-16

## 2022-08-16 PROCEDURE — 99205 OFFICE O/P NEW HI 60 MIN: CPT | Performed by: INTERNAL MEDICINE

## 2022-08-16 RX ORDER — MECLIZINE HYDROCHLORIDE 50 MG/1
TABLET ORAL
COMMUNITY
Start: 2022-05-31

## 2022-08-16 RX ORDER — FELODIPINE 5 MG/1
TABLET, EXTENDED RELEASE ORAL
COMMUNITY
Start: 2022-07-06

## 2022-08-16 RX ORDER — CYANOCOBALAMIN 1000 UG/ML
1000 INJECTION, SOLUTION INTRAMUSCULAR; SUBCUTANEOUS
COMMUNITY

## 2022-08-16 RX ORDER — ALPRAZOLAM 0.25 MG/1
0.25 TABLET ORAL 4 TIMES DAILY
COMMUNITY

## 2022-08-16 RX ORDER — LEVOTHYROXINE SODIUM 0.15 MG/1
TABLET ORAL
COMMUNITY
Start: 2022-07-06

## 2022-08-16 RX ORDER — GABAPENTIN 600 MG/1
TABLET ORAL
COMMUNITY
Start: 2022-08-02

## 2022-08-16 RX ORDER — BENZONATATE 100 MG/1
100 CAPSULE ORAL 3 TIMES DAILY PRN
COMMUNITY
Start: 2022-07-26

## 2022-08-16 RX ORDER — OMEPRAZOLE 40 MG/1
CAPSULE, DELAYED RELEASE ORAL
COMMUNITY
Start: 2022-05-30

## 2022-08-16 RX ORDER — ZOLPIDEM TARTRATE 10 MG/1
TABLET ORAL
COMMUNITY

## 2022-08-16 RX ORDER — CITALOPRAM 20 MG/1
20 TABLET ORAL DAILY
COMMUNITY

## 2022-08-16 RX ORDER — GLIMEPIRIDE 4 MG/1
4 TABLET ORAL
COMMUNITY

## 2022-08-16 RX ORDER — ATORVASTATIN CALCIUM 10 MG/1
10 TABLET, FILM COATED ORAL DAILY
COMMUNITY

## 2022-08-16 RX ORDER — ATENOLOL 25 MG/1
TABLET ORAL
COMMUNITY
Start: 2022-07-13

## 2022-08-16 NOTE — PROGRESS NOTES
Pulmonary Outpatient Consultation Note   Eugenie Bravo 81 y o  female MRN: [id number]  8/16/2022      Referring Physician:  Randa Conklin DO    Reason for Consultation:  Enlarged pulmonary artery on CT chest      Assessment/Plan:    Enlarged pulmonary artery seen on CT of the chest  · Review of her CT of the chest does show a slightly enlarged pulmonary artery  · We discussed that the CT imaging notoriously does over read in terms of pulmonary artery size and pulmonary pressures and that the better screening test would be a 2D echocardiogram  · Overall functionally Eugenie Burton does not have any limitation, she is still able to participate in daily activities, she does not complain about shortness of breath or any respiratory symptoms  · Will proceed with UE echocardiogram for better evaluation of pulmonary pressures    1) Group 1 disease  No prior history of rheumatologic disorder, drug abuse, weight loss/stimulants abuse that can cause Group 1 pulmonary hypertension  No active symptoms suggesting portal hypertension or ascites, no skin changes or arthritis    2) Group 2 disease  is at some risk factors including hypertension and diabetes for HFpEF, risk factors for dCHF (age, arrhythmia, DM 2, dyslipidemia)  - Check TTE    -Troponin have been negative in the past    3) Group 3 disease  No history of lung disorder  Not on any inhaler therapy  CT chest shows clear lungs    -Could consider sleep study in the future, await 2D echo first     4) Group 4 disease does not have a risk factors for VTE, no history of lower extremity swelling/calf pain  Negative family history of hypercoagulable state in young age  5) Group 5 disease  Denies any history of blood disorder/lymphoproliferative disease    Has a positive family history of hematologic malignancy   -Overall CBC and CMP have been normal     Health Maintenance  Immunization History   Administered Date(s) Administered    COVID-19 MODERNA VACC 0 5 ML IM 01/21/2021, 02/24/2021     Return in about 3 months (around 11/16/2022)  History of Present Illness   HPI:  Steve Alex is a 81 y o  female with a past medical history of hypertension, hyperlipidemia, diabetes who presents today after an enlarged pulmonary artery was identified on her CT chest   Tigist Bobo states that she has been having some issues with muscle pain on the left back/rib area and she did suffer a fall which required a trip to the emergency room  She continued to have some discomfort brought up to her primary care physician who sent her for CT of the chest   CT of the chest showed an enlarged pulmonary artery and the patient presents today to the Pulmonary Clinic for evaluation of this  In terms of pulmonary history, Yolanda Fitch is a lifelong nonsmoker  She does not have any respiratory complaints at this time  She is able to participate in her daily activities without any significant limitation  She does state that occasionally she will feel tired  She does not have any wheezing, cough  She has never been diagnosed with any lung disease, no history of asthma, no respiratory medications, denies any significant occupational exposures  Review of Systems   Constitutional: Negative for activity change, chills, fever and unexpected weight change  HENT: Negative for congestion, rhinorrhea and voice change  Respiratory: Negative for cough, chest tightness, shortness of breath and wheezing  Cardiovascular: Negative for chest pain and palpitations  Gastrointestinal: Negative for abdominal distention, constipation, diarrhea, nausea and vomiting  Endocrine: Negative for cold intolerance and heat intolerance  Genitourinary: Negative for dysuria, frequency and urgency  Musculoskeletal: Negative for arthralgias, joint swelling and myalgias  Skin: Negative for color change, pallor and rash  Neurological: Negative for dizziness, weakness and numbness     Psychiatric/Behavioral: Negative for agitation and confusion  The patient is not nervous/anxious  Historical Information   Past Medical History:   Diagnosis Date    Diabetes mellitus ([address] )     Disease of thyroid gland     Hypertension      Past Surgical History:   Procedure Laterality Date    CHOLECYSTECTOMY      CYST REMOVAL      on back    REPLACEMENT TOTAL KNEE Bilateral      History reviewed  No pertinent family history  Occupational History:  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, 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 in the [employer]  Denies any significant occupational exposures that she can identify        Meds/Allergies     Current Outpatient Medications:     meclizine (Antivert) 50 MG tablet, Take by mouth, Disp: , Rfl:     ALPRAZolam (XANAX) 0 25 mg tablet, Take 0 25 mg by mouth 4 (four) times a day, Disp: , Rfl:     ASPIRIN LOW DOSE ADULT PO, Take by mouth, Disp: , Rfl:     atenolol (TENORMIN) 25 mg tablet, , Disp: , Rfl:     atorvastatin (LIPITOR) 10 mg tablet, Take 10 mg by mouth daily, Disp: , Rfl:     benzonatate (TESSALON PERLES) 100 mg capsule, Take 100 mg by mouth 3 (three) times a day as needed, Disp: , Rfl:     citalopram (CeleXA) 20 mg tablet, Take 20 mg by mouth daily, Disp: , Rfl:     cyanocobalamin 1,000 mcg/mL, Inject 1,000 mcg into a muscle every 30 (thirty) days, Disp: , Rfl:     felodipine (PLENDIL) 5 mg 24 hr tablet, , Disp: , Rfl:     gabapentin (NEURONTIN) 600 MG tablet, , Disp: , Rfl:     glimepiride (AMARYL) 4 mg tablet, Take 4 mg by mouth, Disp: , Rfl:     levothyroxine 150 mcg tablet, , Disp: , Rfl:     metFORMIN (GLUCOPHAGE) 500 mg tablet, , Disp: , Rfl:     omeprazole (PriLOSEC) 40 MG capsule, , Disp: , Rfl:     zolpidem (AMBIEN) 10 mg tablet, Take by mouth, Disp: , Rfl:   Allergies   Allergen Reactions    Ciprofloxacin Nausea Only and GI Intolerance     Caused extreme nausea      Sulfamethoxazole-Trimethoprim GI Intolerance and Vomiting       Vitals: Blood pressure 128/67, pulse 67, temperature (!) 96 7 °F (35 9 °C), temperature source Tympanic, height 5' 4" (1 626 m), weight 85 4 kg (188 lb 3 2 oz), SpO2 94 %  Body mass index is 32 3 kg/m²  Oxygen Therapy  SpO2: 94 %  Oxygen Therapy: None (Room air)    Physical Exam  Vitals reviewed  Constitutional:       Appearance: Normal appearance  She is not ill-appearing or toxic-appearing  HENT:      Head: Normocephalic and atraumatic  Nose: Nose normal  No congestion or rhinorrhea  Mouth/Throat:      Mouth: Mucous membranes are moist       Pharynx: Oropharynx is clear  Cardiovascular:      Rate and Rhythm: Normal rate and regular rhythm  Pulses: Normal pulses  Heart sounds: Normal heart sounds  Pulmonary:      Effort: Pulmonary effort is normal  No respiratory distress  Breath sounds: Normal breath sounds  No wheezing, rhonchi or rales  Abdominal:      General: Abdomen is flat  Bowel sounds are normal  There is no distension  Palpations: Abdomen is soft  Tenderness: There is no abdominal tenderness  There is no guarding  Musculoskeletal:         General: No swelling or tenderness  Normal range of motion  Cervical back: Normal range of motion and neck supple  Right lower leg: No edema  Left lower leg: No edema  Skin:     General: Skin is warm and dry  Findings: No rash  Neurological:      General: No focal deficit present  Mental Status: She is alert and oriented to person, place, and time  Mental status is at baseline  Psychiatric:         Mood and Affect: Mood normal          Behavior: Behavior normal          Labs: I have personally reviewed pertinent lab results    Lab Results   Component Value Date    WBC 10 11 01/05/2022    HGB 14 8 01/05/2022    HCT 44 4 01/05/2022    MCV 89 01/05/2022     01/05/2022     Lab Results   Component Value Date    CALCIUM 9 3 01/05/2022    K 3 6 01/05/2022    CO2 25 01/05/2022 CL 95 (L) 01/05/2022    BUN 19 01/05/2022    CREATININE 1 02 01/05/2022     No results found for: IGE  Lab Results   Component Value Date    ALT 41 01/05/2022    AST 36 01/05/2022    ALKPHOS 98 01/05/2022     Imaging and other studies: I have personally reviewed pertinent reports  and I have personally reviewed pertinent films in PACS    Pulmonary function testing:   No PFTS in system    EKG, Pathology, and Other Studies: I have personally reviewed pertinent reports  and I have personally reviewed pertinent films in PACS      [facility]

## 2022-10-03 ENCOUNTER — HOSPITAL ENCOUNTER (OUTPATIENT)
Dept: NON INVASIVE DIAGNOSTICS | Facility: HOSPITAL | Age: 87
Discharge: HOME | End: 2022-10-03
Attending: INTERNAL MEDICINE
Payer: COMMERCIAL

## 2022-10-03 VITALS
HEART RATE: 80 BPM | WEIGHT: 188 LBS | SYSTOLIC BLOOD PRESSURE: 164 MMHG | BODY MASS INDEX: 32.1 KG/M2 | DIASTOLIC BLOOD PRESSURE: 75 MMHG | HEIGHT: 64 IN

## 2022-10-03 DIAGNOSIS — Q25.79 PULMONARY ARTERY ABNORMALITY: ICD-10-CM

## 2022-10-03 LAB
AORTIC ROOT: 3 CM
APICAL FOUR CHAMBER EJECTION FRACTION: 66 %
E WAVE DECELERATION TIME: 283 MS
FRACTIONAL SHORTENING: 30 % (ref 28–44)
INTERVENTRICULAR SEPTUM IN DIASTOLE (PARASTERNAL SHORT AXIS VIEW): 1.7 CM
INTERVENTRICULAR SEPTUM: 1.7 CM (ref 0.6–1.1)
LAAS-AP2: 17.6 CM2
LAAS-AP4: 18.9 CM2
LEFT ATRIUM SIZE: 3.8 CM
LEFT INTERNAL DIMENSION IN SYSTOLE: 2.6 CM (ref 2.1–4)
LEFT VENTRICULAR INTERNAL DIMENSION IN DIASTOLE: 3.7 CM (ref 3.5–6)
LEFT VENTRICULAR POSTERIOR WALL IN END DIASTOLE: 1.2 CM
LEFT VENTRICULAR STROKE VOLUME: 33 ML
LVSV (TEICH): 33 ML
MV E'TISSUE VEL-SEP: 6 CM/S
MV PEAK A VEL: 1.33 M/S
MV PEAK E VEL: 85 CM/S
MV STENOSIS PRESSURE HALF TIME: 82 MS
MV VALVE AREA P 1/2 METHOD: 2.68 CM2
RA PRESSURE ESTIMATED: 8 MMHG
RIGHT ATRIUM AREA SYSTOLE A4C: 15.6 CM2
RIGHT VENTRICLE ID DIMENSION: 2.9 CM
RV PSP: 35 MMHG
SL CV LEFT ATRIUM LENGTH A2C: 5.7 CM
SL CV LV EF: 55
SL CV PED ECHO LEFT VENTRICLE DIASTOLIC VOLUME (MOD BIPLANE) 2D: 57 ML
SL CV PED ECHO LEFT VENTRICLE SYSTOLIC VOLUME (MOD BIPLANE) 2D: 24 ML
TR MAX PG: 27 MMHG
TR PEAK VELOCITY: 2.6 M/S
TRICUSPID ANNULAR PLANE SYSTOLIC EXCURSION: 2 CM
TRICUSPID VALVE PEAK REGURGITATION VELOCITY: 2.58 M/S

## 2022-10-03 PROCEDURE — 93306 TTE W/DOPPLER COMPLETE: CPT | Performed by: INTERNAL MEDICINE

## 2022-10-03 PROCEDURE — 93306 TTE W/DOPPLER COMPLETE: CPT

## 2022-10-17 ENCOUNTER — OFFICE VISIT (OUTPATIENT)
Dept: PULMONOLOGY | Facility: CLINIC | Age: 87
End: 2022-10-17
Payer: COMMERCIAL

## 2022-10-17 VITALS
DIASTOLIC BLOOD PRESSURE: 78 MMHG | HEIGHT: 64 IN | TEMPERATURE: 96.8 F | BODY MASS INDEX: 32.3 KG/M2 | WEIGHT: 189.2 LBS | OXYGEN SATURATION: 95 % | HEART RATE: 59 BPM | SYSTOLIC BLOOD PRESSURE: 148 MMHG

## 2022-10-17 DIAGNOSIS — I10 HYPERTENSION, UNSPECIFIED TYPE: ICD-10-CM

## 2022-10-17 DIAGNOSIS — Q25.79 PULMONARY ARTERY ABNORMALITY: Primary | ICD-10-CM

## 2022-10-17 PROCEDURE — 99213 OFFICE O/P EST LOW 20 MIN: CPT | Performed by: INTERNAL MEDICINE

## 2022-10-17 RX ORDER — ERGOCALCIFEROL 1.25 MG/1
50000 CAPSULE ORAL WEEKLY
COMMUNITY
Start: 2022-10-14

## 2022-10-17 RX ORDER — CYCLOSPORINE 0.5 MG/ML
EMULSION OPHTHALMIC
COMMUNITY
Start: 2022-08-15

## 2022-10-17 NOTE — ASSESSMENT & PLAN NOTE
Dilated PA on imaging in July  Recent TTE with normal RV and no pulmonary hypertension suggested  She has no exertional dyspnea  No further follow-up required

## 2022-10-17 NOTE — PROGRESS NOTES
Pulmonary Outpatient Note   Olu Bravo 81 y o  female MRN: [id number]  10/17/2022      Reason for Consultation:    Chief Complaint   Patient presents with   • Abnormal Imaging Result         Assessment/Plan:    1  Pulmonary artery abnormality  Assessment & Plan:  Dilated PA on imaging in July  Recent TTE with normal RV and no pulmonary hypertension suggested  She has no exertional dyspnea  No further follow-up required  2  Hypertension, unspecified type  Assessment & Plan:  /78  Medications reviewed  Advised her to follow-up with PCP          Health Maintenance  Immunization History   Administered Date(s) Administered   • COVID-19 MODERNA VACC 0 5 ML IM 01/21/2021, 02/24/2021            Return if symptoms worsen or fail to improve  History of Present Illness   HPI:  Colletta Harries is a 81 y o  female who has hypertension, dyslipidemia, diabetes, enlarged PA on CT chest imaging presents for follow-up to pulmonary clinic  She has no shortness of breath, cough, wheeze  She was initially referred to pulmonary for abnormal CT showing Dilated PA  TTE ordered since was normal with no signs of pulmonary hypertension  She still has some right lateral rib pain which improves with Aleve  This was the initial reason that her CT chest was ordered  Review of Systems   Constitutional: Negative for chills and fever  HENT: Negative for ear pain and sore throat  Eyes: Negative for pain and visual disturbance  Respiratory: Negative for cough and shortness of breath  Cardiovascular: Negative for chest pain and palpitations  Gastrointestinal: Negative for abdominal pain and vomiting  Genitourinary: Negative for dysuria and hematuria  Musculoskeletal: Negative for arthralgias and back pain  Skin: Negative for color change and rash  Neurological: Negative for seizures and syncope  All other systems reviewed and are negative            Historical Information Past Medical History:   Diagnosis Date   • Diabetes mellitus ([facility] [address] )    • Disease of thyroid gland    • Hypertension      Past Surgical History:   Procedure Laterality Date   • CHOLECYSTECTOMY     • CYST REMOVAL      on back   • REPLACEMENT TOTAL KNEE Bilateral      History reviewed  No pertinent family history        Meds/Allergies     Current Outpatient Medications:   •  ALPRAZolam (XANAX) 0 25 mg tablet, Take 0 25 mg by mouth 4 (four) times a day, Disp: , Rfl:   •  ASPIRIN LOW DOSE ADULT PO, Take by mouth, Disp: , Rfl:   •  atenolol (TENORMIN) 25 mg tablet, , Disp: , Rfl:   •  atorvastatin (LIPITOR) 10 mg tablet, Take 10 mg by mouth daily, Disp: , Rfl:   •  citalopram (CeleXA) 20 mg tablet, Take 20 mg by mouth daily, Disp: , Rfl:   •  cyanocobalamin 1,000 mcg/mL, Inject 1,000 mcg into a muscle every 30 (thirty) days, Disp: , Rfl:   •  felodipine (PLENDIL) 5 mg 24 hr tablet, , Disp: , Rfl:   •  gabapentin (NEURONTIN) 600 MG tablet, , Disp: , Rfl:   •  glimepiride (AMARYL) 4 mg tablet, Take 4 mg by mouth, Disp: , Rfl:   •  levothyroxine 150 mcg tablet, , Disp: , Rfl:   •  meclizine (ANTIVERT) 50 MG tablet, Take by mouth, Disp: , Rfl:   •  metFORMIN (GLUCOPHAGE) 500 mg tablet, , Disp: , Rfl:   •  omeprazole (PriLOSEC) 40 MG capsule, , Disp: , Rfl:   •  zolpidem (AMBIEN) 10 mg tablet, Take by mouth, Disp: , Rfl:   •  benzonatate (TESSALON PERLES) 100 mg capsule, Take 100 mg by mouth 3 (three) times a day as needed (Patient not taking: Reported on 10/17/2022), Disp: , Rfl:   •  cycloSPORINE (RESTASIS) 0 05 % ophthalmic emulsion, , Disp: , Rfl:   •  ergocalciferol (VITAMIN D2) 50,000 units, Take 50,000 Units by mouth once a week, Disp: , Rfl:   Allergies   Allergen Reactions   • Ciprofloxacin Nausea Only and GI Intolerance     Caused extreme nausea     • Sulfamethoxazole-Trimethoprim GI Intolerance and Vomiting       Vitals: Blood pressure 148/78, pulse 59, temperature (!) 96 8 °F (36 °C), temperature source Tympanic, height 5' 4" (1 626 m), weight 85 8 kg (189 lb 3 2 oz), SpO2 95 %  Body mass index is 32 48 kg/m²  Oxygen Therapy  SpO2: 95 %  Oxygen Therapy: None (Room air)      Physical Exam  Physical Exam  Vitals and nursing note reviewed  Constitutional:       General: She is not in acute distress  Appearance: She is well-developed  HENT:      Head: Normocephalic and atraumatic  Eyes:      Conjunctiva/sclera: Conjunctivae normal    Cardiovascular:      Rate and Rhythm: Normal rate and regular rhythm  Heart sounds: No murmur heard  Pulmonary:      Effort: Pulmonary effort is normal  No respiratory distress  Breath sounds: Normal breath sounds  Abdominal:      Palpations: Abdomen is soft  Tenderness: There is no abdominal tenderness  Musculoskeletal:      Cervical back: Neck supple  Right lower leg: No edema  Left lower leg: No edema  Skin:     General: Skin is warm and dry  Neurological:      Mental Status: She is alert  Psychiatric:         Mood and Affect: Mood normal          Behavior: Behavior normal          Labs: I have personally reviewed pertinent lab results      ABG: No results found for: PHART, IPK7HQV, PO2ART, TMY7LXH, W6PULHJI, BEART, SOURCE,   BNP: No results found for: BNP,   CBC:  Lab Results   Component Value Date    WBC 10 11 01/05/2022    HGB 14 8 01/05/2022    HCT 44 4 01/05/2022    MCV 89 01/05/2022     01/05/2022    EOSPCT 0 01/05/2022    EOSABS 0 01 01/05/2022    NEUTOPHILPCT 57 01/05/2022    LYMPHOPCT 31 01/05/2022   ,   CMP:   Lab Results   Component Value Date    SODIUM 131 (L) 01/05/2022    K 3 6 01/05/2022    CL 95 (L) 01/05/2022    CO2 25 01/05/2022    BUN 19 01/05/2022    CREATININE 1 02 01/05/2022    CALCIUM 9 3 01/05/2022    AST 36 01/05/2022    ALT 41 01/05/2022    ALKPHOS 98 01/05/2022    EGFR 50 01/05/2022   ,   PT/INR: No results found for: PT, INR,   Troponin: No results found for: TROPONINI      Imaging and other studies: I have personally reviewed pertinent reports  and I have personally reviewed pertinent films in PACS    CT Chest 7/29/22  1  No acute findings in the chest   2   Newly dilated main pulmonary artery measuring 35 mm suggesting pulmonary arterial hypertension  LUNGS:  Linear atelectasis along the right hemidiaphragm similar to prior study  No consolidation or edema  Scattered tiny calcified granulomata  No suspicious mass or nodule  Few scattered 1-2 mm micronodules are unchanged from prior study      CTA Chest 12/27/2021  No pulmonary embolism  Minor right basilar atelectasis  Pulmonary function testing:   Pulmonary Functions Testing Results:    No results found for: FEV1, FVC, UJD6NDM, TLC, DLCO        EKG, Pathology, and Other Studies: I have personally reviewed pertinent reports  TTE 10/3/22  •  Left Ventricle: Left ventricular cavity size is normal  Wall thickness is normal  There is mild to moderate concentric hypertrophy  The left ventricular ejection fraction is 55%  Systolic function is normal  Wall motion is normal  Diastolic function is mildly abnormal, consistent with grade I (abnormal) relaxation  •  Right Ventricle: Right ventricular cavity size is normal  Systolic function is normal       [facility]

## 2024-06-21 DIAGNOSIS — R79.89 OTHER SPECIFIED ABNORMAL FINDINGS OF BLOOD CHEMISTRY: ICD-10-CM

## 2024-06-21 DIAGNOSIS — R60.0 LOCALIZED EDEMA: ICD-10-CM

## 2024-06-26 ENCOUNTER — HOSPITAL ENCOUNTER (OUTPATIENT)
Dept: NON INVASIVE DIAGNOSTICS | Facility: HOSPITAL | Age: 89
Discharge: HOME | End: 2024-06-26
Payer: COMMERCIAL

## 2024-06-26 DIAGNOSIS — R79.89 OTHER SPECIFIED ABNORMAL FINDINGS OF BLOOD CHEMISTRY: ICD-10-CM

## 2024-06-26 DIAGNOSIS — R60.0 LOCALIZED EDEMA: ICD-10-CM

## 2024-06-26 PROCEDURE — 93970 EXTREMITY STUDY: CPT | Performed by: SURGERY

## 2024-06-26 PROCEDURE — 93970 EXTREMITY STUDY: CPT

## 2025-04-02 ENCOUNTER — HOSPITAL ENCOUNTER (OUTPATIENT)
Dept: CT IMAGING | Facility: HOSPITAL | Age: 89
Discharge: HOME | End: 2025-04-02
Payer: COMMERCIAL

## 2025-04-02 DIAGNOSIS — Z90.5 ACQUIRED ABSENCE OF KIDNEY: ICD-10-CM

## 2025-04-02 DIAGNOSIS — Z87.440 PERSONAL HISTORY OF URINARY (TRACT) INFECTION: ICD-10-CM

## 2025-04-02 DIAGNOSIS — R10.9 STOMACH ACHE: ICD-10-CM

## 2025-04-02 PROCEDURE — 74176 CT ABD & PELVIS W/O CONTRAST: CPT
